# Patient Record
Sex: MALE | Race: WHITE | NOT HISPANIC OR LATINO | Employment: UNEMPLOYED | URBAN - METROPOLITAN AREA
[De-identification: names, ages, dates, MRNs, and addresses within clinical notes are randomized per-mention and may not be internally consistent; named-entity substitution may affect disease eponyms.]

---

## 2020-10-30 ENCOUNTER — OFFICE VISIT (OUTPATIENT)
Dept: INTERNAL MEDICINE | Facility: CLINIC | Age: 45
End: 2020-10-30
Payer: COMMERCIAL

## 2020-10-30 ENCOUNTER — HOSPITAL ENCOUNTER (OUTPATIENT)
Dept: GENERAL RADIOLOGY | Facility: CLINIC | Age: 45
End: 2020-10-30
Attending: INTERNAL MEDICINE
Payer: COMMERCIAL

## 2020-10-30 ENCOUNTER — TELEPHONE (OUTPATIENT)
Dept: INTERNAL MEDICINE | Facility: CLINIC | Age: 45
End: 2020-10-30

## 2020-10-30 VITALS
TEMPERATURE: 97.8 F | RESPIRATION RATE: 16 BRPM | HEIGHT: 70 IN | HEART RATE: 84 BPM | WEIGHT: 256 LBS | DIASTOLIC BLOOD PRESSURE: 78 MMHG | SYSTOLIC BLOOD PRESSURE: 126 MMHG | BODY MASS INDEX: 36.65 KG/M2 | OXYGEN SATURATION: 98 %

## 2020-10-30 DIAGNOSIS — G89.29 CHRONIC RIGHT SHOULDER PAIN: ICD-10-CM

## 2020-10-30 DIAGNOSIS — R10.84 ABDOMINAL PAIN, GENERALIZED: ICD-10-CM

## 2020-10-30 DIAGNOSIS — G89.29 CHRONIC MIDLINE THORACIC BACK PAIN: ICD-10-CM

## 2020-10-30 DIAGNOSIS — M54.6 CHRONIC MIDLINE THORACIC BACK PAIN: ICD-10-CM

## 2020-10-30 DIAGNOSIS — Z86.79 H/O AORTIC VALVE REPAIR: ICD-10-CM

## 2020-10-30 DIAGNOSIS — Q23.0 CONGENITAL STENOSIS OF AORTIC VALVE: Primary | ICD-10-CM

## 2020-10-30 DIAGNOSIS — Z80.0 FAMILY HISTORY OF PANCREATIC CANCER: ICD-10-CM

## 2020-10-30 DIAGNOSIS — M25.511 CHRONIC RIGHT SHOULDER PAIN: ICD-10-CM

## 2020-10-30 DIAGNOSIS — K92.1 BLOOD IN STOOL: ICD-10-CM

## 2020-10-30 DIAGNOSIS — Z72.0 TOBACCO ABUSE: ICD-10-CM

## 2020-10-30 DIAGNOSIS — Z98.890 H/O AORTIC VALVE REPAIR: ICD-10-CM

## 2020-10-30 LAB
ALBUMIN SERPL-MCNC: 3.4 G/DL (ref 3.4–5)
ALP SERPL-CCNC: 110 U/L (ref 40–150)
ALT SERPL W P-5'-P-CCNC: 39 U/L (ref 0–70)
AMYLASE SERPL-CCNC: 61 U/L (ref 30–110)
ANION GAP SERPL CALCULATED.3IONS-SCNC: 5 MMOL/L (ref 3–14)
AST SERPL W P-5'-P-CCNC: 18 U/L (ref 0–45)
BILIRUB SERPL-MCNC: 0.2 MG/DL (ref 0.2–1.3)
BUN SERPL-MCNC: 9 MG/DL (ref 7–30)
CALCIUM SERPL-MCNC: 8.9 MG/DL (ref 8.5–10.1)
CHLORIDE SERPL-SCNC: 109 MMOL/L (ref 94–109)
CO2 SERPL-SCNC: 27 MMOL/L (ref 20–32)
CREAT SERPL-MCNC: 0.79 MG/DL (ref 0.66–1.25)
ERYTHROCYTE [DISTWIDTH] IN BLOOD BY AUTOMATED COUNT: 13.6 % (ref 10–15)
GFR SERPL CREATININE-BSD FRML MDRD: >90 ML/MIN/{1.73_M2}
GLUCOSE SERPL-MCNC: 105 MG/DL (ref 70–99)
HCT VFR BLD AUTO: 44.5 % (ref 40–53)
HGB BLD-MCNC: 14.5 G/DL (ref 13.3–17.7)
LIPASE SERPL-CCNC: 188 U/L (ref 73–393)
MCH RBC QN AUTO: 28.4 PG (ref 26.5–33)
MCHC RBC AUTO-ENTMCNC: 32.6 G/DL (ref 31.5–36.5)
MCV RBC AUTO: 87 FL (ref 78–100)
PLATELET # BLD AUTO: 251 10E9/L (ref 150–450)
POTASSIUM SERPL-SCNC: 3.7 MMOL/L (ref 3.4–5.3)
PROT SERPL-MCNC: 7.7 G/DL (ref 6.8–8.8)
RBC # BLD AUTO: 5.11 10E12/L (ref 4.4–5.9)
SODIUM SERPL-SCNC: 141 MMOL/L (ref 133–144)
WBC # BLD AUTO: 12 10E9/L (ref 4–11)

## 2020-10-30 PROCEDURE — 85027 COMPLETE CBC AUTOMATED: CPT | Performed by: INTERNAL MEDICINE

## 2020-10-30 PROCEDURE — 72070 X-RAY EXAM THORAC SPINE 2VWS: CPT

## 2020-10-30 PROCEDURE — 80053 COMPREHEN METABOLIC PANEL: CPT | Performed by: INTERNAL MEDICINE

## 2020-10-30 PROCEDURE — 72100 X-RAY EXAM L-S SPINE 2/3 VWS: CPT

## 2020-10-30 PROCEDURE — 83690 ASSAY OF LIPASE: CPT | Performed by: INTERNAL MEDICINE

## 2020-10-30 PROCEDURE — 36415 COLL VENOUS BLD VENIPUNCTURE: CPT | Performed by: INTERNAL MEDICINE

## 2020-10-30 PROCEDURE — 82150 ASSAY OF AMYLASE: CPT | Performed by: INTERNAL MEDICINE

## 2020-10-30 PROCEDURE — 99204 OFFICE O/P NEW MOD 45 MIN: CPT | Performed by: INTERNAL MEDICINE

## 2020-10-30 RX ORDER — OMEGA-3 FATTY ACIDS/FISH OIL 300-1000MG
200 CAPSULE ORAL PRN
COMMUNITY

## 2020-10-30 RX ORDER — AMOXICILLIN 500 MG/1
CAPSULE ORAL
Qty: 12 CAPSULE | Refills: 0 | Status: SHIPPED | OUTPATIENT
Start: 2020-10-30

## 2020-10-30 ASSESSMENT — PAIN SCALES - GENERAL: PAINLEVEL: MILD PAIN (3)

## 2020-10-30 ASSESSMENT — MIFFLIN-ST. JEOR: SCORE: 2047.46

## 2020-10-30 NOTE — TELEPHONE ENCOUNTER
[facility]  Please give result info to pt when he returns call, per Dr Sanchez's note.  ................Alan Fowler LPN,   October 30, 2020,      2:02 PM,   [facility]

## 2020-10-30 NOTE — RESULT ENCOUNTER NOTE
Called pt - LMTCB for results, see phone encounter.  ................Alan Fowler LPN,   October 30, 2020,      2:03 PM,   [facility]

## 2020-10-30 NOTE — RESULT ENCOUNTER NOTE
Called pt - LMTCB for results, see phone encounter.  ................Alan Fowler LPN,   October 30, 2020,      2:03 PM,   [facility]

## 2020-10-30 NOTE — TELEPHONE ENCOUNTER
----- Message from Jeremy Sanchez MD sent at 10/30/2020  1:11 PM CDT -----  Please call and let him know his labs are okay with normal glucose, kidneys, liver tests.  His blood level is stable.  Let him know the thoracic and lumbar x-rays are okay without fractures, some arthritic changes. We will continue with the CAT scan and echocardiogram.

## 2020-10-30 NOTE — PROGRESS NOTES
Subjective     Nicolas Zimmerman is a 44 year old male who presents to clinic today for the following health issues:    HPI         Chief Complaint   Patient presents with     Establish Care     discuss few health concerns     Has a lot of back pain, can't stand very long, more in the upper back area and down the spine.  Uses a cane.  Feels he wore himself out.      Some blood in the stool as well. Happens when wiping.      Brother with pancreatic cancer this past year.  Patient feels abdominal pains and atypical symptoms.      Open heart at age 5 for aortic stenosis and fixed valve to a bicuspid valve.     History of broken arms from trauma.  Right arm can go numb.     Lives in [place], lives with girlfriend.   Smokes 1-2 cig a day.  Not much alcohol.   Not working currently.      Past Medical History:   Diagnosis Date     Congenital stenosis of aortic valve      Past Surgical History:   Procedure Laterality Date     CARDIAC SURGERY      open heart at age 5, fixed aortic stenosis but now bicuspid valve.     Current Outpatient Medications   Medication     amoxicillin (AMOXIL) 500 MG capsule     ibuprofen (ADVIL/MOTRIN) 200 MG capsule     No current facility-administered medications for this visit.      Social History     Tobacco Use     Smoking status: Current Every Day Smoker     Types: Cigarettes     Smokeless tobacco: Never Used   Substance Use Topics     Alcohol use: None     Drug use: None       Review of Systems   CONSTITUTIONAL: NEGATIVE for fever, chills, change in weight  INTEGUMENTARY/SKIN: NEGATIVE for worrisome rashes, moles or lesions  EYES: NEGATIVE for vision changes or irritation  ENT/MOUTH: NEGATIVE for ear, mouth and throat problems  RESP: NEGATIVE for significant cough or SOB  CV: NEGATIVE for chest pain, palpitations or peripheral edema  GI: abd pain and blood in the stool   : NEGATIVE for frequency, dysuria, or hematuria  MUSCULOSKELETAL:chronic back pains   NEURO: NEGATIVE for weakness, dizziness  "or paresthesias  ENDOCRINE: NEGATIVE for temperature intolerance, skin/hair changes  HEME: NEGATIVE for bleeding problems  PSYCHIATRIC: NEGATIVE for changes in mood or affect      Objective    /78   Pulse 84   Temp 97.8  F (36.6  C) (Temporal)   Resp 16   Ht 1.778 m (5' 10\")   Wt 116.1 kg (256 lb)   SpO2 98%   BMI 36.73 kg/m    Body mass index is 36.73 kg/m .  Physical Exam   GENERAL: healthy, alert and no distress  EYES: Eyes grossly normal to inspection, PERRL and conjunctivae and sclerae normal  HENT: ear canals and TM's normal, nose and mouth without ulcers or lesions  NECK: no adenopathy, no asymmetry, masses, or scars and thyroid normal to palpation  RESP: lungs clear to auscultation - no rales, rhonchi or wheezes  CV: regular rates and rhythm, normal S1 S2, no S3 or S4, grade 2/6 systolic murmur heard best over the , peripheral pulses strong and no peripheral edema  ABDOMEN: soft, nontender, no hepatosplenomegaly, no masses and bowel sounds normal  MS: no gross musculoskeletal defects noted, no edema--right shoulder has normal movement but does have some grinding on examination  Spinous tenderness palpation in the thoracic and in the L1-2 region  SKIN: no suspicious lesions or rashes  NEURO: Normal strength and tone, mentation intact and speech normal  PSYCH: mentation appears normal, affect normal/bright            Assessment & Plan     Congenital stenosis of aortic valve  Congenital stenosis of the aortic valve was treated and treated with surgery at age 5, will get an echo.  - Echocardiogram Complete; Future  - amoxicillin (AMOXIL) 500 MG capsule; 2000 mg before dental or procedures    H/O aortic valve repair  Murmur is present we will check an echocardiogram.  - Echocardiogram Complete; Future  - amoxicillin (AMOXIL) 500 MG capsule; 2000 mg before dental or procedures    Blood in stool  We will check his hemoglobin today, most likely this is hemorrhoids but needs a colonoscopy to rule out " "anything higher up causing the blood.  - GASTROENTEROLOGY ADULT REF PROCEDURE ONLY; Future  - CBC with platelets  - Comprehensive metabolic panel    Abdominal pain, generalized  Abdominal pain with some blood in the stool concern with family history of pancreatic cancer, patient is very worried.  We will proceed with an abdominal CT, LFTs, amylase lipase.  - CBC with platelets  - Comprehensive metabolic panel  - Lipase  - Amylase  - CT Abdomen Pelvis w Contrast; Future    Chronic midline thoracic back pain  We will check x-ray for the thoracic back pain.  - XR Thoracic Spine 2 Views; Future  - XR Lumbar Spine 2/3 Views; Future    Chronic right shoulder pain  Right shoulder pain will continue to work on range of motion he may see orthopedics in the future do further work-up as needed.    Tobacco abuse  Recommended he stop smoking    Family history of pancreatic cancer  Getting a CT scan       Tobacco Cessation:   reports that he has been smoking cigarettes. He has never used smokeless tobacco.        BMI:   Estimated body mass index is 36.73 kg/m  as calculated from the following:    Height as of this encounter: 1.778 m (5' 10\").    Weight as of this encounter: 116.1 kg (256 lb).   Weight management plan: Discussed healthy diet and exercise guidelines             No follow-ups on file.    Jeremy Sanchez MD  [facility]    "

## 2020-11-04 ENCOUNTER — TELEPHONE (OUTPATIENT)
Dept: FAMILY MEDICINE | Facility: CLINIC | Age: 45
End: 2020-11-04

## 2020-11-04 ENCOUNTER — HOSPITAL ENCOUNTER (OUTPATIENT)
Dept: CARDIOLOGY | Facility: CLINIC | Age: 45
End: 2020-11-04
Attending: INTERNAL MEDICINE
Payer: COMMERCIAL

## 2020-11-04 ENCOUNTER — HOSPITAL ENCOUNTER (OUTPATIENT)
Dept: CT IMAGING | Facility: CLINIC | Age: 45
End: 2020-11-04
Attending: INTERNAL MEDICINE
Payer: COMMERCIAL

## 2020-11-04 DIAGNOSIS — R10.84 ABDOMINAL PAIN, GENERALIZED: ICD-10-CM

## 2020-11-04 DIAGNOSIS — Z11.59 ENCOUNTER FOR SCREENING FOR OTHER VIRAL DISEASES: Primary | ICD-10-CM

## 2020-11-04 DIAGNOSIS — Z98.890 H/O AORTIC VALVE REPAIR: ICD-10-CM

## 2020-11-04 DIAGNOSIS — Q23.0 CONGENITAL STENOSIS OF AORTIC VALVE: ICD-10-CM

## 2020-11-04 DIAGNOSIS — Z86.79 H/O AORTIC VALVE REPAIR: ICD-10-CM

## 2020-11-04 PROCEDURE — 93306 TTE W/DOPPLER COMPLETE: CPT | Mod: 26 | Performed by: INTERNAL MEDICINE

## 2020-11-04 PROCEDURE — 250N000011 HC RX IP 250 OP 636: Performed by: INTERNAL MEDICINE

## 2020-11-04 PROCEDURE — 74177 CT ABD & PELVIS W/CONTRAST: CPT

## 2020-11-04 PROCEDURE — 255N000002 HC RX 255 OP 636: Performed by: INTERNAL MEDICINE

## 2020-11-04 PROCEDURE — 250N000009 HC RX 250: Performed by: INTERNAL MEDICINE

## 2020-11-04 RX ORDER — IOPAMIDOL 755 MG/ML
500 INJECTION, SOLUTION INTRAVASCULAR ONCE
Status: COMPLETED | OUTPATIENT
Start: 2020-11-04 | End: 2020-11-04

## 2020-11-04 RX ADMIN — IOPAMIDOL 100 ML: 755 INJECTION, SOLUTION INTRAVENOUS at 14:32

## 2020-11-04 RX ADMIN — PERFLUTREN 3 ML: 6.52 INJECTION, SUSPENSION INTRAVENOUS at 13:51

## 2020-11-04 RX ADMIN — SODIUM CHLORIDE 60 ML: 9 INJECTION, SOLUTION INTRAVENOUS at 14:31

## 2020-11-04 NOTE — TELEPHONE ENCOUNTER
Left message for patient to return call to schedule EGD/colonoscopy. If Cynthia or Meenakshi are not available, please transfer to same day surgery

## 2020-11-04 NOTE — TELEPHONE ENCOUNTER
Date of colonoscopy/EGD: 11/25  Surgeon: Dr. Valdovinos  Prep:Miralax  Packet:Colonoscopy/EGD instructions mailed to patient's home address.   Date: 11/4/2020      Surgery Scheduler

## 2020-11-05 ENCOUNTER — TELEPHONE (OUTPATIENT)
Dept: INTERNAL MEDICINE | Facility: CLINIC | Age: 45
End: 2020-11-05

## 2020-11-05 NOTE — TELEPHONE ENCOUNTER
----- Message from Jeremy Sanchez MD sent at 11/4/2020  5:04 PM CST -----  Please, know his echocardiogram shows the changes of his heart surgery but still normal heart function.  We could have cardiology evaluate him in the future.

## 2020-11-06 ENCOUNTER — TELEPHONE (OUTPATIENT)
Dept: INTERNAL MEDICINE | Facility: CLINIC | Age: 45
End: 2020-11-06

## 2020-11-06 NOTE — TELEPHONE ENCOUNTER
Left message for patient to call back, please give results and help schedule a follow up appointment.    Natividad Taylor XRO/

## 2020-11-06 NOTE — TELEPHONE ENCOUNTER
----- Message from Jeremy Sanchez MD sent at 11/6/2020 12:04 PM CST -----  Please let him know his CAT scan shows some inflammation called mesenteric adenitis in the fat of the abdomen.  There is no masses or any issue by the pancreas.  He does have some fatty infiltration of the liver.  I would like to discuss and see him after he has his colonoscopy.`

## 2020-11-21 DIAGNOSIS — Z11.59 ENCOUNTER FOR SCREENING FOR OTHER VIRAL DISEASES: ICD-10-CM

## 2020-11-21 PROCEDURE — U0003 INFECTIOUS AGENT DETECTION BY NUCLEIC ACID (DNA OR RNA); SEVERE ACUTE RESPIRATORY SYNDROME CORONAVIRUS 2 (SARS-COV-2) (CORONAVIRUS DISEASE [COVID-19]), AMPLIFIED PROBE TECHNIQUE, MAKING USE OF HIGH THROUGHPUT TECHNOLOGIES AS DESCRIBED BY CMS-2020-01-R: HCPCS | Performed by: INTERNAL MEDICINE

## 2020-11-22 LAB
LABORATORY COMMENT REPORT: NORMAL
SARS-COV-2 RNA SPEC QL NAA+PROBE: NEGATIVE
SARS-COV-2 RNA SPEC QL NAA+PROBE: NORMAL
SPECIMEN SOURCE: NORMAL
SPECIMEN SOURCE: NORMAL

## 2020-11-25 ENCOUNTER — HOSPITAL ENCOUNTER (OUTPATIENT)
Facility: CLINIC | Age: 45
Discharge: HOME | End: 2020-11-25
Attending: INTERNAL MEDICINE | Admitting: INTERNAL MEDICINE
Payer: COMMERCIAL

## 2020-11-25 VITALS
HEART RATE: 77 BPM | OXYGEN SATURATION: 95 % | TEMPERATURE: 98.4 F | RESPIRATION RATE: 16 BRPM | DIASTOLIC BLOOD PRESSURE: 82 MMHG | SYSTOLIC BLOOD PRESSURE: 112 MMHG

## 2020-11-25 LAB — COLONOSCOPY: NORMAL

## 2020-11-25 PROCEDURE — G0500 MOD SEDAT ENDO SERVICE >5YRS: HCPCS | Performed by: INTERNAL MEDICINE

## 2020-11-25 PROCEDURE — 250N000009 HC RX 250: Performed by: INTERNAL MEDICINE

## 2020-11-25 PROCEDURE — 250N000011 HC RX IP 250 OP 636: Performed by: INTERNAL MEDICINE

## 2020-11-25 PROCEDURE — 88305 TISSUE EXAM BY PATHOLOGIST: CPT | Mod: TC | Performed by: INTERNAL MEDICINE

## 2020-11-25 PROCEDURE — 45385 COLONOSCOPY W/LESION REMOVAL: CPT | Mod: PT | Performed by: INTERNAL MEDICINE

## 2020-11-25 PROCEDURE — 88305 TISSUE EXAM BY PATHOLOGIST: CPT | Mod: 26 | Performed by: PATHOLOGY

## 2020-11-25 RX ORDER — ONDANSETRON 4 MG/1
4 TABLET, ORALLY DISINTEGRATING ORAL EVERY 6 HOURS PRN
Status: DISCONTINUED | OUTPATIENT
Start: 2020-11-25 | End: 2020-11-25 | Stop reason: HOSPADM

## 2020-11-25 RX ORDER — NALOXONE HYDROCHLORIDE 0.4 MG/ML
0.2 INJECTION, SOLUTION INTRAMUSCULAR; INTRAVENOUS; SUBCUTANEOUS
Status: DISCONTINUED | OUTPATIENT
Start: 2020-11-25 | End: 2020-11-25 | Stop reason: HOSPADM

## 2020-11-25 RX ORDER — FENTANYL CITRATE 50 UG/ML
INJECTION, SOLUTION INTRAMUSCULAR; INTRAVENOUS PRN
Status: DISCONTINUED | OUTPATIENT
Start: 2020-11-25 | End: 2020-11-25 | Stop reason: HOSPADM

## 2020-11-25 RX ORDER — NALOXONE HYDROCHLORIDE 0.4 MG/ML
0.4 INJECTION, SOLUTION INTRAMUSCULAR; INTRAVENOUS; SUBCUTANEOUS
Status: DISCONTINUED | OUTPATIENT
Start: 2020-11-25 | End: 2020-11-25 | Stop reason: HOSPADM

## 2020-11-25 RX ORDER — PROCHLORPERAZINE MALEATE 5 MG
10 TABLET ORAL EVERY 6 HOURS PRN
Status: DISCONTINUED | OUTPATIENT
Start: 2020-11-25 | End: 2020-11-25 | Stop reason: HOSPADM

## 2020-11-25 RX ORDER — ONDANSETRON 2 MG/ML
4 INJECTION INTRAMUSCULAR; INTRAVENOUS EVERY 6 HOURS PRN
Status: DISCONTINUED | OUTPATIENT
Start: 2020-11-25 | End: 2020-11-25 | Stop reason: HOSPADM

## 2020-11-25 RX ORDER — LIDOCAINE 40 MG/G
CREAM TOPICAL
Status: DISCONTINUED | OUTPATIENT
Start: 2020-11-25 | End: 2020-11-25 | Stop reason: HOSPADM

## 2020-11-25 RX ORDER — FLUMAZENIL 0.1 MG/ML
0.2 INJECTION, SOLUTION INTRAVENOUS
Status: DISCONTINUED | OUTPATIENT
Start: 2020-11-25 | End: 2020-11-25 | Stop reason: HOSPADM

## 2020-11-25 RX ORDER — ONDANSETRON 2 MG/ML
4 INJECTION INTRAMUSCULAR; INTRAVENOUS
Status: DISCONTINUED | OUTPATIENT
Start: 2020-11-25 | End: 2020-11-25 | Stop reason: HOSPADM

## 2020-11-25 RX ADMIN — LIDOCAINE HYDROCHLORIDE 1 ML: 10 INJECTION, SOLUTION EPIDURAL; INFILTRATION; INTRACAUDAL; PERINEURAL at 09:58

## 2020-11-25 NOTE — CONSULTS
[facility] GI Pre-Procedure Physical Assessment    Nicolas Zimmerman MRN# [id number]   Age: 45 year old YOB: 1975      Date of Surgery: 11/25/2020  Location [facility]      Date of Exam 11/25/2020 Facility (Same day)       Home clinic: [facility]  Primary care provider: No Ref-Primary, Physician         Active problem list:   There is no problem list on file for this patient.           Medications (include herbals and vitamins):   Any Plavix use in the last 7 days?  No     Current Facility-Administered Medications   Medication     lidocaine (LMX4) kit     lidocaine 1 % 0.1-1 mL     ondansetron (ZOFRAN) injection 4 mg     sodium chloride (PF) 0.9% PF flush 3 mL     sodium chloride (PF) 0.9% PF flush 3 mL             Allergies:    No Known Allergies  Allergy to Latex?  No  Allergy to tape?    No          Social History:     Social History     Tobacco Use     Smoking status: Current Every Day Smoker     Types: Cigarettes     Smokeless tobacco: Never Used   Substance Use Topics     Alcohol use: Not on file            Physical Exam:   All vitals have been reviewed  Blood pressure 106/83, pulse 89, temperature 98.4  F (36.9  C), temperature source Oral, resp. rate 18, SpO2 94 %.  Airway assessment:   Patient is able to open mouth wide  Patient is able to stick out tongue      Lungs:   No increased work of breathing, good air exchange, clear to auscultation bilaterally, no crackles or wheezing      Cardiovascular:   Normal apical impulse, regular rate and rhythm, normal S1 and S2, no S3 or S4, and no murmur noted           Lab / Radiology Results:   All laboratory data reviewed          Assessment:   Appropriately NPO  Chief complaint or anatomic assessment of involved area: screening         Plan:   Moderate (conscious) sedation     Patient's active problems diagnostically and therapeutically optimized for the planned procedure  Risks, benefits, alternatives to sedation  and blood explained and consent obtained  Risks, benefits, alternatives to procedure explained and consent obtained  Orders and progress notes are in the chart  Discharge from Phase 1 and / or Phase 2 recovery when patient meets criteria    I have reviewed the history and physical, lab finding(s), diagnostic data, medicaitons, and the plan for sedation.  I have determined this patient to be an appropriate candidate for the planned sedation / procedure and have reassessed the patient immediately prior to sedation / procedure.    I have personally and medically directed the administration of medications used.    Milton Valdovinos MD

## 2020-11-25 NOTE — LETTER
[address] [address]           Tel [phone]     Nicolas CLARICE ChampagneErasmo  [address] [address] [place] [address]  [facility] [address]      November 30, 2020    Dear Nicolas,    This letter is written to inform you of the results of your recent colonoscopy.  One polyp was a hyperplastic polyp.  This has no clinical significance.  The other polyp was an adenoma.    Adenomatous polyps are entirely benign (non-cancerous); however, patients who have developed these polyps are at an increased risk for developing additional polyps in the future. If these are not eventually removed, there is a risk of developing colon cancer. We will advise more frequent examinations with you because of the risk associated with this type of polyp.    Given these findings and your family history of colon cancer, I recommend that you undergo a repeat colonoscopy in 5 year(s) for surveillance. We will enter you into a recall system so you receive a reminder closer to the time that you are due for repeat examination.     Please remember that this recommendation is made with the understanding that you are not experiencing persistent changes in bowel function, bleeding per rectum, and/or significant abdominal pain. If you experience these symptoms, please contact your primary care provider for a further evaluation.     If you have any questions or concerns about the results of your colonoscopy or the appropriate follow-up, please contact my assistant at [phone].    Sincerely,        Milton Valdovinos MD  Gastroenterology

## 2020-11-25 NOTE — DISCHARGE INSTRUCTIONS
[facility]    Home Care Following Endoscopy          Activity:    You have just undergone an endoscopic procedure usually performed with conscious sedation.  Do not work or operate machinery (including a car) for at least 12 hours.      I encourage you to walk and attempt to pass this air as soon as possible.    Diet:    Return to the diet you were on before your procedure but eat lightly for the first 12-24 hours.    Drink plenty of water.    Resume any regular medications unless otherwise advised by your physician.  Please begin any new medication prescribed as a result of your procedure as directed by your physician.     If you had any biopsy or polyp removed please refrain from aspirin or aspirin products for 2 days.  If on Coumadin please restart as instructed by your physician.   Pain:    You may take Tylenol as needed for pain.  Expected Recovery:    You can expect some mild abdominal fullness and/or discomfort due to the air used to inflate your intestinal tract. It is also normal to have a mild sore throat after upper endoscopy.    Call Your Physician if You Have:    After Colonoscopy:  o Worsening persisting abdominal pain which is worse with activity.  o Fevers (>101 degrees F), chills or shakes.  o Passage of continued blood with bowel movements.   Any questions or concerns about your recovery, please call [phone] or after hours [phone] Nurse Advice Line.    Follow-up Care:    You should receive a call or letter with your results within 1 week. Please call if you have not received a notification of your results.  If asked to return to clinic please make an appointment 1 week after your procedure.  Call [phone].

## 2020-11-30 LAB — COPATH REPORT: NORMAL

## 2020-12-14 ENCOUNTER — OFFICE VISIT (OUTPATIENT)
Dept: INTERNAL MEDICINE | Facility: CLINIC | Age: 45
End: 2020-12-14
Payer: COMMERCIAL

## 2020-12-14 VITALS
WEIGHT: 259.9 LBS | BODY MASS INDEX: 37.29 KG/M2 | DIASTOLIC BLOOD PRESSURE: 88 MMHG | SYSTOLIC BLOOD PRESSURE: 122 MMHG | TEMPERATURE: 97.3 F

## 2020-12-14 DIAGNOSIS — G89.29 CHRONIC BILATERAL THORACIC BACK PAIN: ICD-10-CM

## 2020-12-14 DIAGNOSIS — Z86.0100 HISTORY OF COLONIC POLYPS: ICD-10-CM

## 2020-12-14 DIAGNOSIS — M54.6 CHRONIC BILATERAL THORACIC BACK PAIN: ICD-10-CM

## 2020-12-14 DIAGNOSIS — Q23.0 CONGENITAL STENOSIS OF AORTIC VALVE: Primary | ICD-10-CM

## 2020-12-14 PROCEDURE — 99214 OFFICE O/P EST MOD 30 MIN: CPT | Performed by: INTERNAL MEDICINE

## 2020-12-14 NOTE — PROGRESS NOTES
Subjective     Nicolas Zimmerman is a 45 year old male who presents to clinic today for the following health issues:    HPI         Discuss results of CT scan, echocardiogram and colonoscopy    Previous blood in stool, colonoscopy was ok, couple polyps.  Does have a few hemorrhoids.  Should use fiber.      Echo and heart is ok, has some moderate aortic stenosis.    Has pain in his joints and in his back.  Has had wear and tear on his body.      Tries to stretch and exercise, can only stand for an hour or so at a time.      Past Medical History:   Diagnosis Date     Congenital stenosis of aortic valve      Current Outpatient Medications   Medication     ibuprofen (ADVIL/MOTRIN) 200 MG capsule     amoxicillin (AMOXIL) 500 MG capsule     No current facility-administered medications for this visit.      Social History     Tobacco Use     Smoking status: Current Every Day Smoker     Types: Cigarettes     Smokeless tobacco: Never Used   Substance Use Topics     Alcohol use: Yes     Comment: social, very rare     Drug use: Never     Review of Systems   CONSTITUTIONAL: NEGATIVE for fever, chills, change in weight  INTEGUMENTARY/SKIN: NEGATIVE for worrisome rashes, moles or lesions  EYES: NEGATIVE for vision changes or irritation  ENT/MOUTH: NEGATIVE for ear, mouth and throat problems  RESP: NEGATIVE for significant cough or SOB  CV: NEGATIVE for chest pain, palpitations or peripheral edema  GI: POSITIVE for blood in stool.   MUSCULOSKELETAL:back pains and joints pains,   NEURO: NEGATIVE for weakness, dizziness or paresthesias  ENDOCRINE: NEGATIVE for temperature intolerance, skin/hair changes  HEME: NEGATIVE for bleeding problems  PSYCHIATRIC: NEGATIVE for changes in mood or affect      Objective    /88   Temp 97.3  F (36.3  C) (Temporal)   Wt 117.9 kg (259 lb 14.4 oz)   BMI 37.29 kg/m    There is no height or weight on file to calculate BMI.  Physical Exam   GENERAL: healthy, alert and no distress  RESP: lungs clear to  auscultation - no rales, rhonchi or wheezes  CV: regular rates and rhythm, normal S1 S2, no S3 or S4, grade 2/6 systolic murmur heard best over the base, peripheral pulses strong and no peripheral edema  ABDOMEN: soft, nontender, no hepatosplenomegaly, no masses and bowel sounds normal          Assessment & Plan     Congenital stenosis of aortic valve  45-year-old gentleman who has a history of a congenital aortic valve which was repaired when he was 5, echocardiogram shows some thickened valves and moderate aortic stenosis we will get a cardiology consult to help follow this long-term.  - CARDIOLOGY EVAL ADULT REFERRAL; Future    Chronic bilateral thoracic back pain  Chronic bilateral thoracic and lumbar back pain, will refer to chiropractor to see if they can work with him to get stronger and help his symptoms he can stand better.  - CHIROPRACTIC REFERRAL    History of colonic polyps  History of colon polyps he will get a repeat colonoscopy in 5 years.  Had some internal hemorrhoids which be the cause of his bleeding.                No follow-ups on file.    Jeremy Sanchez MD  [facility]

## 2020-12-21 ENCOUNTER — THERAPY VISIT (OUTPATIENT)
Dept: CHIROPRACTIC MEDICINE | Facility: CLINIC | Age: 45
End: 2020-12-21
Attending: INTERNAL MEDICINE
Payer: COMMERCIAL

## 2020-12-21 DIAGNOSIS — M99.04 SEGMENTAL DYSFUNCTION OF SACRAL REGION: ICD-10-CM

## 2020-12-21 DIAGNOSIS — M54.9 MECHANICAL BACK PAIN: ICD-10-CM

## 2020-12-21 DIAGNOSIS — M99.02 SEGMENTAL DYSFUNCTION OF THORACIC REGION: Primary | ICD-10-CM

## 2020-12-21 DIAGNOSIS — M99.03 SEGMENTAL DYSFUNCTION OF LUMBAR REGION: ICD-10-CM

## 2020-12-21 PROCEDURE — 97035 APP MDLTY 1+ULTRASOUND EA 15: CPT | Mod: GP | Performed by: CHIROPRACTOR

## 2020-12-21 PROCEDURE — 99203 OFFICE O/P NEW LOW 30 MIN: CPT | Mod: 25 | Performed by: CHIROPRACTOR

## 2020-12-21 PROCEDURE — 98941 CHIROPRACT MANJ 3-4 REGIONS: CPT | Mod: AT | Performed by: CHIROPRACTOR

## 2020-12-21 PROCEDURE — 97110 THERAPEUTIC EXERCISES: CPT | Mod: GP | Performed by: CHIROPRACTOR

## 2020-12-21 NOTE — PROGRESS NOTES
"Chiropractic Clinic Visit    PCP: No Ref-Primary, Physician    Nicolas ONEIL Erasmo is a 45 year old male who is seen as a self referral presenting with thoracic spine pain. He states that her has \"used himself up.\" He points to pain in his neck and CT junction and where his shoulder blades meet. He feels like he cannot straighten out his back where the shoulder blades meet. He states that his pain started about 20 years and has \"grown with his life.\" He has worked a lot of hard jobs and feels like his body is \"just getting used up.\" He gets pinched nerves feelings in his back. He describes the pain as sharp, but dull all the time. It is lightening at times. He does have lower back pain, as well. His right thigh goes numb at times. He rates his pain as \"I have a high pain tolerance.\" He does state that his pain is rated 7-8/10 with spasms \"and stuff.\" His ribs are sharp, too. He is sleeping okay at night but worked overnights for so long that he is \"reversed.\" He has a lot going on with his family and in his personal life that is affecting him. He is sleeping on a broken bed. He is taking Advil for pain and he likes to not have to take anything as he has a previous heart condition. He has been without medical care for a long time as he was well established when he was younger and then hasn't been seen. He saw Dr. Sanchez but is trying to establish care today. He has back issues in his family and has tried to echo the stretches as best as he can. He has not used ice or heat. He feels very tight all the time. His pain depends on what he was doing the day before. He has never been to a chiropractor before.        Injury: Nothing specific but played soccer and football    Location of Pain: central and bilateral thoracic and cervical spine pain  Duration of Pain: 20 yrs  Rating of Pain at worst: 10/10  Rating of Pain Currently: 7-8/10  Symptoms are better with: Nothing  Symptoms are worse with: \"whatever he does\"  Additional " Features: Patient notes that he has had a horrible year. He is very tearful today.        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    Past Medical History:   Diagnosis Date     Congenital stenosis of aortic valve      Past Surgical History:   Procedure Laterality Date     CARDIAC SURGERY      open heart at age 5, fixed aortic stenosis but now bicuspid valve.       [id number]  There were no vitals taken for this visit.    GENERAL APPEARANCE: healthy, alert and no distress   GAIT: NORMAL  SKIN: no suspicious lesions or rashes  NEURO: Normal strength and tone, mentation intact and speech normal  PSYCH:  mentation appears normal and affect normal/bright    Nicolas was asked to complete the Neck Disability Index, today in the office. Patient did not fill out any paperwork today.     Cervical Spine Exam    Range of Motion:   Extension and LR mildly reduced and patient feels pain    Inspection:         Anterior head position    Tender:   Central and bilateral upper back, thoracic and lower back pain      Muscle strength:       C5 (shoulder  abduction) symmetric 5/5 Normal       C6 (elbow flexion) symmetric 5/5 Normal       C7 (elbow extension) symmetric 5/5 Normal       C8 (finger abduction, thumb flexion) symmetric 5/5 Normal    Reflexes:        C5 (biceps) symmetric 2 bilaterally       C6 (supinator) symmetric 2 bilaterally       C7 (triceps) symmetric 2 bilaterally    Sensation:       grossly intact througout upper extremities bilaterally    Special Tests:  Colby's- negative, Distraction - negative and Shoulder depression - Right negative and Left negative    Lymphatics:        no edema noted in the upper extremities       Segmental spinal dysfunction/restrictions found at:  T1 , T5 , T10, L4  and PSIS Left     Patient's report of high pain tolerance did not correlate with demonstration of gentle pressure with treatment.     Muscle spasm found in:  Lumbar erector spine, T-spine paraspinal and Traps      Radiology:  THORACIC SPINE TWO VIEWS  10/30/2020 11:28 AM      COMPARISON: None.     HISTORY: Chronic midline thoracic back pain.                                                                      IMPRESSION: There is normal alignment of the thoracic vertebrae.  Vertebral body heights of the thoracic spine are normal. No evidence  for fracture of the thoracic spine. There is degenerative endplate  spurring to varying degrees at all levels of the thoracic spine.     HUNTER ESPINO MD    Assessment:    No diagnosis found.    RX ordered/plan of care: Mechanical back pain likely due to previous trauma, with associated myospasm and intersegmental dysfunction.  Anticipated outcomes: Patient is expected to get some relief with care.  Possible risks and side effects: Minimal soreness expected post-adjustment.     After discussing the risk and benefits of care, patient consented to treatment.    Patient's condition:  Patient had restrictions pre-manipulation and Patient had decreased motion prior to manipulation    Treatment effectiveness:  Post  manipulation there is better intersegmental movement and Patient claims to feel looser post manipulation    Plan:    Procedures:    Evaluation and Management:  08149 Moderate level exam 30 min    CMT:  09748 Chiropractic manipulative treatment 3-4 regions performed   Thoracic: Diversified, T1, T5, T10, Prone  Lumbar: Drop Table, L4, Prone  Pelvis: Drop Table, PSIS Left , Prone  Flexion Distraction to stretch paraspinals.    Modalities:  05075: US:  1.0 Pathak/cm squared for 8 minutes at 1.2mhz  Continuous  pulsed, Location: bilateral thoracic paraspinals    Therapeutic procedures:  83622: Therapeutic Exercises - postural stretches demonstrated and practiced.   Gave patient Ice instructions post adjustment, and instructions for acute care    Response to Treatment:  Patient tolerated treatment well today.     Prognosis: Guarded      Treatment plan and goals:  Goals:  Decrease pain in thoracic and lower back.  Promote general health and improve posture.  Establish care.     Frequency of care  Duration of care is estimated to be 6 weeks, from the initial treatment.  It is estimated that the patient will need a total of 8 visits to resolve this episode.  For the initial therapeutic trial of care, the frequency is recommended at 1-2 times per week.  A reevaluation would be clinically appropriate in 8 visits, to determine progress and further course of care.    In-Office Treatment  Evaluation  Spinal Chiropractic Manipulative Therapy:  Trial of care - re-evaluate after 8 visits.       Recommendations:    Instructions:  stretch as instructed  and heat 15 minutes every other hour as needed    Follow-up:  Return to care in 1 week. Patient was emotional throughout treatment. Consider referral for mental health if necessary.      Disclaimer: This note consists of symbols derived from keyboarding, dictation and/or voice recognition software. As a result, there may be errors in the script that have gone undetected. Please consider  this when interpreting information found in this chart.

## 2020-12-28 ENCOUNTER — THERAPY VISIT (OUTPATIENT)
Dept: CHIROPRACTIC MEDICINE | Facility: CLINIC | Age: 45
End: 2020-12-28
Payer: COMMERCIAL

## 2020-12-28 DIAGNOSIS — M54.9 MECHANICAL BACK PAIN: ICD-10-CM

## 2020-12-28 DIAGNOSIS — M99.02 SEGMENTAL DYSFUNCTION OF THORACIC REGION: Primary | ICD-10-CM

## 2020-12-28 DIAGNOSIS — M99.03 SEGMENTAL DYSFUNCTION OF LUMBAR REGION: ICD-10-CM

## 2020-12-28 DIAGNOSIS — M99.04 SEGMENTAL DYSFUNCTION OF SACRAL REGION: ICD-10-CM

## 2020-12-28 PROCEDURE — 98941 CHIROPRACT MANJ 3-4 REGIONS: CPT | Mod: AT | Performed by: CHIROPRACTOR

## 2020-12-28 NOTE — PROGRESS NOTES
"Visit #:  2 of 8 based on treatment plan 12/21/2020    Subjective:  Nicolas Zimmerman is a 45 year old male who is seen in f/u up for:     Data Unavailable.     Since last visit on 12/21/2020,  Nicolas Zimmerman reports the following changes: Patient presents and states that he had \"quite a day after he left here.\" He had to go and move a mattress from [employer] and it was a rough day. It took him more than a day to recover from that. He did feel some relief in his neck that day after that \"really good crack.\" He rates his current pain level 5/10 today. Today is a better day for him, the [place] week was more \"chill\" for him and he was able to stretch. His new bed has also helped.     Patient has multiple questions regarding mechanical pain, his x-ray findings, etc. I did my best to answer his questions to understand his pain.        Objective:  The following was observed:    P: palpatory tenderness neck and throughout back    A: static palpation demonstrates intersegmental asymmetry     R: motion palpation notes restricted motion    T: localized muscle spasm at: T-spine paraspinal and Traps Bilaterally      Assessment:    RX ordered/plan of care: Mechanical back pain likely due to previous trauma, with associated myospasm and intersegmental dysfunction.  Anticipated outcomes: Patient is expected to get some relief with care.  Possible risks and side effects: Minimal soreness expected post-adjustment.     Segmental spinal dysfunction/restrictions found at:  T1   T5  T10  L1  L2  L3  L4  L5  PSIS Left    Diagnoses:    No diagnosis found.    Patient's condition:  Patient had restrictions pre-manipulation and Patient had decreased motion prior to manipulation    Treatment effectiveness:  Post manipulation there is better intersegmental movement and Patient claims to feel looser post manipulation      Procedures:  CMT:  96334 Chiropractic manipulative treatment 3-4 regions performed   Thoracic: Mobilization, T1, T5, T10, " Prone  Lumbar: Drop Table, L4, Prone  Pelvis: Drop Table, PSIS Left , Prone  Flexion Distraction of lumbar spine.     Modalities:  47181: MSTM:  To Lumbar erector spine, T-spine paraspinal and Traps  for 5 min HYPERVOLT    Therapeutic procedures:  Gave patient Ice instructions post adjustment, and instructions for acute care      Prognosis: Guarded    Progress towards Goals:   Decrease pain in thoracic and lower back.  Promote general health and improve posture.  Establish care.       Response to Treatment:   Reduction of symptoms with first treatment.       Recommendations:    Instructions:ice 20 minutes every other hour as needed, heat 15 minutes every other hour as needed and stretch as instructed at visit    Follow-up:  Return to care in 1 week.

## 2021-01-07 ENCOUNTER — HOSPITAL ENCOUNTER (OUTPATIENT)
Dept: CARDIOLOGY | Facility: CLINIC | Age: 46
Discharge: HOME | End: 2021-01-07
Attending: INTERNAL MEDICINE | Admitting: INTERNAL MEDICINE
Payer: COMMERCIAL

## 2021-01-07 ENCOUNTER — OFFICE VISIT (OUTPATIENT)
Dept: CARDIOLOGY | Facility: CLINIC | Age: 46
End: 2021-01-07
Payer: COMMERCIAL

## 2021-01-07 VITALS
HEIGHT: 70 IN | WEIGHT: 263.2 LBS | OXYGEN SATURATION: 97 % | HEART RATE: 88 BPM | BODY MASS INDEX: 37.68 KG/M2 | DIASTOLIC BLOOD PRESSURE: 82 MMHG | SYSTOLIC BLOOD PRESSURE: 110 MMHG

## 2021-01-07 DIAGNOSIS — Q21.0 VSD (VENTRICULAR SEPTAL DEFECT): Primary | ICD-10-CM

## 2021-01-07 DIAGNOSIS — Q23.0 CONGENITAL STENOSIS OF AORTIC VALVE: ICD-10-CM

## 2021-01-07 PROCEDURE — 99204 OFFICE O/P NEW MOD 45 MIN: CPT | Performed by: INTERNAL MEDICINE

## 2021-01-07 PROCEDURE — 93010 ELECTROCARDIOGRAM REPORT: CPT | Performed by: INTERNAL MEDICINE

## 2021-01-07 PROCEDURE — 93005 ELECTROCARDIOGRAM TRACING: CPT | Performed by: REHABILITATION PRACTITIONER

## 2021-01-07 ASSESSMENT — MIFFLIN-ST. JEOR: SCORE: 2075.12

## 2021-01-07 NOTE — LETTER
1/7/2021    Physician No Ref-Primary  No address on file    RE: Nicolas Zimmerman       Dear Colleague,    I had the pleasure of seeing Nicolas Zimmerman in the [facility].          HPI:     This is a 45 year old male here for evaluation of bicuspid valve. In 1980 he had open heart surgery at age 5 for aortic stenosis at [facility]. Reports he had a tricuspid valve that was not opening well and surgeon turned tricuspid valve into bicuspid valve. Also possible spontaneously resolved muscular VSD.     Family history negative for SCD or unexplained heart attacks, aortic disease.     On ROS: no lens dislocation, normal eye-width, normal uvula, normal palate, has small hypermobility in thumb joints, no skin translucency, normal skin, hernias, no abnormal wound healing, easy bruising or bleeding, no chest wall deformities, no dental crowding, normal stature. No scoliosis. Has chronic back pain and recurrent joint issues however. Uses a cane to walk.     Echocardiogram images reviewed in clinic in detail with patient present: agree with below report, probable bicuspid valve.     Repaired congenital heart disease. Aortic stenosis and tricuspid valve  repaired in childhood per patient, details not available.  The left ventricle is normal in size.  Mid septal divit, surrounding hypokinesis, may represent repaired VSD or  spontaniously closed muscular VSD  There is mild to moderate concentric left ventricular hypertrophy.  The visual ejection fraction is estimated at 60-65%.  Grade I or early diastolic dysfunction.  Diastolic Doppler findings (E/E' ratio and/or other parameters) suggest left  ventricular filling pressures are normal.  Mildly decreased right ventricular systolic function  The left atrium is moderately dilated, normal RA size.  The tricuspid valve is not well visualized.  No tricuspid regurgitation.  The aortic valve is not well visualized due to patient habitus. The valve  appears distorted and  thickened. Cannot establish number of cusps.  Moderate valvular aortic stenosis. Mean gradient 31 mmHg, JAMILA estimated 1 cm2.  DI 0.27.  There is trace aortic regurgitation.    ASSESSMENT/PLAN:    #Congenital aortic stenosis and ? VSD   -asked patient to attempt to acquire operative report. He says born with tricuspid and was surgically altered to bicuspid, now with moderate aortic stenosis, asymptomatic   -has findings of connective tissue dx on physical exam, suspect his MSK issues are related to this so would like to scan his aorta as well for aneurysmal disease and coarctation rule out (do not suspect as good perfusion to his legs)  -MRA chest at [facility] with cardiac MRI     Follow up by phone visit in 3 months to review.    Dagmar Mathew MD MSc  [facility]      PAST MEDICAL HISTORY  Past Medical History:   Diagnosis Date     Congenital stenosis of aortic valve        CURRENT MEDICATIONS  Current Outpatient Medications   Medication Sig Dispense Refill     ibuprofen (ADVIL/MOTRIN) 200 MG capsule Take 200 mg by mouth as needed for fever       amoxicillin (AMOXIL) 500 MG capsule 2000 mg before dental or procedures (Patient not taking: Reported on 12/14/2020) 12 capsule 0       PAST SURGICAL HISTORY:  Past Surgical History:   Procedure Laterality Date     CARDIAC SURGERY      open heart at age 5, fixed aortic stenosis but now bicuspid valve.       ALLERGIES   No Known Allergies    FAMILY HISTORY  Family History   Problem Relation Age of Onset     Uterine Cancer Mother      Breast Cancer Mother      Arthritis Mother      Other - See Comments Father         degenerative disc disease     Depression Father      Alcoholism Father      Pancreatic Cancer Brother 53     Alcoholism Brother        SOCIAL HISTORY  Social History     Socioeconomic History     Marital status: Single     Spouse name: Not on file     Number of children: Not on file     Years of education: Not on file     Highest education level: Not on  "file   Occupational History     Not on file   Social Needs     Financial resource strain: Not on file     Food insecurity     Worry: Not on file     Inability: Not on file     Transportation needs     Medical: Not on file     Non-medical: Not on file   Tobacco Use     Smoking status: Current Every Day Smoker     Types: Cigarettes     Smokeless tobacco: Never Used   Substance and Sexual Activity     Alcohol use: Yes     Comment: social, very rare     Drug use: Never     Sexual activity: Yes   Lifestyle     Physical activity     Days per week: Not on file     Minutes per session: Not on file     Stress: Not on file   Relationships     Social connections     Talks on phone: Not on file     Gets together: Not on file     Attends Faith service: Not on file     Active member of club or organization: Not on file     Attends meetings of clubs or organizations: Not on file     Relationship status: Not on file     Intimate partner violence     Fear of current or ex partner: Not on file     Emotionally abused: Not on file     Physically abused: Not on file     Forced sexual activity: Not on file   Other Topics Concern     Parent/sibling w/ CABG, MI or angioplasty before 65F 55M? Not Asked   Social History Narrative     Not on file       ROS:   Constitutional: No fever, chills, or sweats. No weight gain/loss   ENT: No visual disturbance, ear ache, epistaxis, sore throat  Allergies/Immunologic: Negative  Respiratory: No cough, hemoptysia  Cardiovascular: As per HPI  GI: No nausea, vomiting, hematemesis, melena, or hematochezia  : No urinary frequency, dysuria, or hematuria  Integument: Negative  Psychiatric: Negative  Neuro: Negative  Endocrinology: Negative   Musculoskeletal: See HPI   Vascular: No walking impairment, claudication, ischemic rest pain or nonhealing wounds    EXAM:  /82 (BP Location: Right arm, Patient Position: Fowlers, Cuff Size: Adult Large)   Pulse 88   Ht 1.778 m (5' 10\")   Wt 119.4 kg (263 lb " 3.2 oz)   SpO2 97%   BMI 37.77 kg/m    In general, the patient is a pleasant male in no apparent distress.    HEENT: NC/AT.  PERRLA.  EOMI.  Sclerae white, not injected.  Nares clear.  Pharynx without erythema or exudate.  Dentition intact.    Neck: No adenopathy.  No thyromegaly. Carotids +2/2 bilaterally without bruits.  No jugular venous distension.   Heart: RRR. Normal S1, S2 splits physiologically. II/VI LORIE, no rub, click, or gallop. The PMI is in the 5th ICS in the midclavicular line. There is no heave.    Lungs: CTA.  No ronchi, wheezes, rales.  No dullness to percussion.   Abdomen: Soft, nontender, nondistended. No organomegaly. No AAA.  No bruits.   Extremities: No clubbing, cyanosis, or edema.  No wounds. No varicose veins signs of chronic venous insufficiency.   Vascular: No bruits are noted.    Labs:  LIPID RESULTS:  No results found for: CHOL, HDL, LDL, TRIG, CHOLHDLRATIO, NHDL    LIVER ENZYME RESULTS:  Lab Results   Component Value Date    AST 18 10/30/2020    ALT 39 10/30/2020       CBC RESULTS:  Lab Results   Component Value Date    WBC 12.0 (H) 10/30/2020    RBC 5.11 10/30/2020    HGB 14.5 10/30/2020    HCT 44.5 10/30/2020    MCV 87 10/30/2020    MCH 28.4 10/30/2020    MCHC 32.6 10/30/2020    RDW 13.6 10/30/2020     10/30/2020       BMP RESULTS:  Lab Results   Component Value Date     10/30/2020    POTASSIUM 3.7 10/30/2020    CHLORIDE 109 10/30/2020    CO2 27 10/30/2020    ANIONGAP 5 10/30/2020     (H) 10/30/2020    BUN 9 10/30/2020    CR 0.79 10/30/2020    GFRESTIMATED >90 10/30/2020    GFRESTBLACK >90 10/30/2020    RAMIRO 8.9 10/30/2020        A1C RESULTS:  No results found for: A1C          Thank you for allowing me to participate in the care of your patient.      Sincerely,     Dagmar Mathew MD     [facility]    cc:   Jeremy Sanchez MD  [address] [place] [address]

## 2021-01-07 NOTE — PATIENT INSTRUCTIONS
1. MRI and MRA at [facility]   2. Follow up with Dr. Mathew in 3 months (virtual visit)

## 2021-01-07 NOTE — LETTER
1/7/2021    Jeremy Sanchez MD  [address] [place] [address]    RE: Nicolas OENIL Erasmo       Dear Colleague,    I had the pleasure of seeing Nicolas Champagneule in the [facility].    HPI:     This is a 45 year old male here for evaluation of bicuspid valve. In 1980 he had open heart surgery at age 5 for aortic stenosis at [facility]. Reports he had a tricuspid valve that was not opening well and surgeon turned tricuspid valve into bicuspid valve. Also possible spontaneously resolved muscular VSD.     Family history negative for SCD or unexplained heart attacks, aortic disease.     On ROS: no lens dislocation, normal eye-width, normal uvula, normal palate, has small hypermobility in thumb joints, no skin translucency, normal skin, hernias, no abnormal wound healing, easy bruising or bleeding, no chest wall deformities, no dental crowding, normal stature. No scoliosis. Has chronic back pain and recurrent joint issues however. Uses a cane to walk.     Echocardiogram images reviewed in clinic in detail with patient present: agree with below report, probable bicuspid valve.     Repaired congenital heart disease. Aortic stenosis and tricuspid valve  repaired in childhood per patient, details not available.  The left ventricle is normal in size.  Mid septal divit, surrounding hypokinesis, may represent repaired VSD or  spontaniously closed muscular VSD  There is mild to moderate concentric left ventricular hypertrophy.  The visual ejection fraction is estimated at 60-65%.  Grade I or early diastolic dysfunction.  Diastolic Doppler findings (E/E' ratio and/or other parameters) suggest left  ventricular filling pressures are normal.  Mildly decreased right ventricular systolic function  The left atrium is moderately dilated, normal RA size.  The tricuspid valve is not well visualized.  No tricuspid regurgitation.  The aortic valve is not well visualized due to patient habitus. The valve  appears  distorted and thickened. Cannot establish number of cusps.  Moderate valvular aortic stenosis. Mean gradient 31 mmHg, JAMILA estimated 1 cm2.  DI 0.27.  There is trace aortic regurgitation.    ASSESSMENT/PLAN:    #Congenital aortic stenosis and ? VSD   -asked patient to attempt to acquire operative report. He says born with tricuspid and was surgically altered to bicuspid, now with moderate aortic stenosis, asymptomatic   -has findings of connective tissue dx on physical exam, suspect his MSK issues are related to this so would like to scan his aorta as well for aneurysmal disease and coarctation rule out (do not suspect as good perfusion to his legs)  -MRA chest at [facility] with cardiac MRI     Follow up by phone visit in 3 months to review.    Dagmar Mathew MD MSc  [facility]      PAST MEDICAL HISTORY  Past Medical History:   Diagnosis Date     Congenital stenosis of aortic valve        CURRENT MEDICATIONS  Current Outpatient Medications   Medication Sig Dispense Refill     ibuprofen (ADVIL/MOTRIN) 200 MG capsule Take 200 mg by mouth as needed for fever       amoxicillin (AMOXIL) 500 MG capsule 2000 mg before dental or procedures (Patient not taking: Reported on 12/14/2020) 12 capsule 0       PAST SURGICAL HISTORY:  Past Surgical History:   Procedure Laterality Date     CARDIAC SURGERY      open heart at age 5, fixed aortic stenosis but now bicuspid valve.       ALLERGIES   No Known Allergies    FAMILY HISTORY  Family History   Problem Relation Age of Onset     Uterine Cancer Mother      Breast Cancer Mother      Arthritis Mother      Other - See Comments Father         degenerative disc disease     Depression Father      Alcoholism Father      Pancreatic Cancer Brother 53     Alcoholism Brother        SOCIAL HISTORY  Social History     Socioeconomic History     Marital status: Single     Spouse name: Not on file     Number of children: Not on file     Years of education: Not on file     Highest education  "level: Not on file   Occupational History     Not on file   Social Needs     Financial resource strain: Not on file     Food insecurity     Worry: Not on file     Inability: Not on file     Transportation needs     Medical: Not on file     Non-medical: Not on file   Tobacco Use     Smoking status: Current Every Day Smoker     Types: Cigarettes     Smokeless tobacco: Never Used   Substance and Sexual Activity     Alcohol use: Yes     Comment: social, very rare     Drug use: Never     Sexual activity: Yes   Lifestyle     Physical activity     Days per week: Not on file     Minutes per session: Not on file     Stress: Not on file   Relationships     Social connections     Talks on phone: Not on file     Gets together: Not on file     Attends Islam service: Not on file     Active member of club or organization: Not on file     Attends meetings of clubs or organizations: Not on file     Relationship status: Not on file     Intimate partner violence     Fear of current or ex partner: Not on file     Emotionally abused: Not on file     Physically abused: Not on file     Forced sexual activity: Not on file   Other Topics Concern     Parent/sibling w/ CABG, MI or angioplasty before 65F 55M? Not Asked   Social History Narrative     Not on file       ROS:   Constitutional: No fever, chills, or sweats. No weight gain/loss   ENT: No visual disturbance, ear ache, epistaxis, sore throat  Allergies/Immunologic: Negative  Respiratory: No cough, hemoptysia  Cardiovascular: As per HPI  GI: No nausea, vomiting, hematemesis, melena, or hematochezia  : No urinary frequency, dysuria, or hematuria  Integument: Negative  Psychiatric: Negative  Neuro: Negative  Endocrinology: Negative   Musculoskeletal: See HPI   Vascular: No walking impairment, claudication, ischemic rest pain or nonhealing wounds    EXAM:  /82 (BP Location: Right arm, Patient Position: Fowlers, Cuff Size: Adult Large)   Pulse 88   Ht 1.778 m (5' 10\")   Wt " 119.4 kg (263 lb 3.2 oz)   SpO2 97%   BMI 37.77 kg/m    In general, the patient is a pleasant male in no apparent distress.    HEENT: NC/AT.  PERRLA.  EOMI.  Sclerae white, not injected.  Nares clear.  Pharynx without erythema or exudate.  Dentition intact.    Neck: No adenopathy.  No thyromegaly. Carotids +2/2 bilaterally without bruits.  No jugular venous distension.   Heart: RRR. Normal S1, S2 splits physiologically. II/VI LORIE, no rub, click, or gallop. The PMI is in the 5th ICS in the midclavicular line. There is no heave.    Lungs: CTA.  No ronchi, wheezes, rales.  No dullness to percussion.   Abdomen: Soft, nontender, nondistended. No organomegaly. No AAA.  No bruits.   Extremities: No clubbing, cyanosis, or edema.  No wounds. No varicose veins signs of chronic venous insufficiency.   Vascular: No bruits are noted.    Labs:  LIPID RESULTS:  No results found for: CHOL, HDL, LDL, TRIG, CHOLHDLRATIO, NHDL    LIVER ENZYME RESULTS:  Lab Results   Component Value Date    AST 18 10/30/2020    ALT 39 10/30/2020       CBC RESULTS:  Lab Results   Component Value Date    WBC 12.0 (H) 10/30/2020    RBC 5.11 10/30/2020    HGB 14.5 10/30/2020    HCT 44.5 10/30/2020    MCV 87 10/30/2020    MCH 28.4 10/30/2020    MCHC 32.6 10/30/2020    RDW 13.6 10/30/2020     10/30/2020       BMP RESULTS:  Lab Results   Component Value Date     10/30/2020    POTASSIUM 3.7 10/30/2020    CHLORIDE 109 10/30/2020    CO2 27 10/30/2020    ANIONGAP 5 10/30/2020     (H) 10/30/2020    BUN 9 10/30/2020    CR 0.79 10/30/2020    GFRESTIMATED >90 10/30/2020    GFRESTBLACK >90 10/30/2020    RAMIRO 8.9 10/30/2020        A1C RESULTS:  No results found for: A1C      Thank you for allowing me to participate in the care of your patient.    Sincerely,     Dagmar Mathew MD     [facility]

## 2021-01-07 NOTE — PROGRESS NOTES
HPI:     This is a 45 year old male here for evaluation of bicuspid valve. In 1980 he had open heart surgery at age 5 for aortic stenosis at [facility]. Reports he had a tricuspid valve that was not opening well and surgeon turned tricuspid valve into bicuspid valve. Also possible spontaneously resolved muscular VSD.     Family history negative for SCD or unexplained heart attacks, aortic disease.     On ROS: no lens dislocation, normal eye-width, normal uvula, normal palate, has small hypermobility in thumb joints, no skin translucency, normal skin, hernias, no abnormal wound healing, easy bruising or bleeding, no chest wall deformities, no dental crowding, normal stature. No scoliosis. Has chronic back pain and recurrent joint issues however. Uses a cane to walk.     Echocardiogram images reviewed in clinic in detail with patient present: agree with below report, probable bicuspid valve.     Repaired congenital heart disease. Aortic stenosis and tricuspid valve  repaired in childhood per patient, details not available.  The left ventricle is normal in size.  Mid septal divit, surrounding hypokinesis, may represent repaired VSD or  spontaniously closed muscular VSD  There is mild to moderate concentric left ventricular hypertrophy.  The visual ejection fraction is estimated at 60-65%.  Grade I or early diastolic dysfunction.  Diastolic Doppler findings (E/E' ratio and/or other parameters) suggest left  ventricular filling pressures are normal.  Mildly decreased right ventricular systolic function  The left atrium is moderately dilated, normal RA size.  The tricuspid valve is not well visualized.  No tricuspid regurgitation.  The aortic valve is not well visualized due to patient habitus. The valve  appears distorted and thickened. Cannot establish number of cusps.  Moderate valvular aortic stenosis. Mean gradient 31 mmHg, JAMILA estimated 1 cm2.  DI 0.27.  There is trace aortic  regurgitation.    ASSESSMENT/PLAN:    #Congenital aortic stenosis and ? VSD   -asked patient to attempt to acquire operative report. He says born with tricuspid and was surgically altered to bicuspid, now with moderate aortic stenosis, asymptomatic   -has findings of connective tissue dx on physical exam, suspect his MSK issues are related to this so would like to scan his aorta as well for aneurysmal disease and coarctation rule out (do not suspect as good perfusion to his legs)  -MRA chest at [facility] with cardiac MRI     Follow up by phone visit in 3 months to review.    Dagmar Mathew MD MSc  [facility]      PAST MEDICAL HISTORY  Past Medical History:   Diagnosis Date     Congenital stenosis of aortic valve        CURRENT MEDICATIONS  Current Outpatient Medications   Medication Sig Dispense Refill     ibuprofen (ADVIL/MOTRIN) 200 MG capsule Take 200 mg by mouth as needed for fever       amoxicillin (AMOXIL) 500 MG capsule 2000 mg before dental or procedures (Patient not taking: Reported on 12/14/2020) 12 capsule 0       PAST SURGICAL HISTORY:  Past Surgical History:   Procedure Laterality Date     CARDIAC SURGERY      open heart at age 5, fixed aortic stenosis but now bicuspid valve.       ALLERGIES   No Known Allergies    FAMILY HISTORY  Family History   Problem Relation Age of Onset     Uterine Cancer Mother      Breast Cancer Mother      Arthritis Mother      Other - See Comments Father         degenerative disc disease     Depression Father      Alcoholism Father      Pancreatic Cancer Brother 53     Alcoholism Brother        SOCIAL HISTORY  Social History     Socioeconomic History     Marital status: Single     Spouse name: Not on file     Number of children: Not on file     Years of education: Not on file     Highest education level: Not on file   Occupational History     Not on file   Social Needs     Financial resource strain: Not on file     Food insecurity     Worry: Not on file     Inability:  "Not on file     Transportation needs     Medical: Not on file     Non-medical: Not on file   Tobacco Use     Smoking status: Current Every Day Smoker     Types: Cigarettes     Smokeless tobacco: Never Used   Substance and Sexual Activity     Alcohol use: Yes     Comment: social, very rare     Drug use: Never     Sexual activity: Yes   Lifestyle     Physical activity     Days per week: Not on file     Minutes per session: Not on file     Stress: Not on file   Relationships     Social connections     Talks on phone: Not on file     Gets together: Not on file     Attends Sabianist service: Not on file     Active member of club or organization: Not on file     Attends meetings of clubs or organizations: Not on file     Relationship status: Not on file     Intimate partner violence     Fear of current or ex partner: Not on file     Emotionally abused: Not on file     Physically abused: Not on file     Forced sexual activity: Not on file   Other Topics Concern     Parent/sibling w/ CABG, MI or angioplasty before 65F 55M? Not Asked   Social History Narrative     Not on file       ROS:   Constitutional: No fever, chills, or sweats. No weight gain/loss   ENT: No visual disturbance, ear ache, epistaxis, sore throat  Allergies/Immunologic: Negative  Respiratory: No cough, hemoptysia  Cardiovascular: As per HPI  GI: No nausea, vomiting, hematemesis, melena, or hematochezia  : No urinary frequency, dysuria, or hematuria  Integument: Negative  Psychiatric: Negative  Neuro: Negative  Endocrinology: Negative   Musculoskeletal: See HPI   Vascular: No walking impairment, claudication, ischemic rest pain or nonhealing wounds    EXAM:  /82 (BP Location: Right arm, Patient Position: Fowlers, Cuff Size: Adult Large)   Pulse 88   Ht 1.778 m (5' 10\")   Wt 119.4 kg (263 lb 3.2 oz)   SpO2 97%   BMI 37.77 kg/m    In general, the patient is a pleasant male in no apparent distress.    HEENT: NC/AT.  LANNY.  SARAHMI.  Sclerae white, " not injected.  Nares clear.  Pharynx without erythema or exudate.  Dentition intact.    Neck: No adenopathy.  No thyromegaly. Carotids +2/2 bilaterally without bruits.  No jugular venous distension.   Heart: RRR. Normal S1, S2 splits physiologically. II/VI LORIE, no rub, click, or gallop. The PMI is in the 5th ICS in the midclavicular line. There is no heave.    Lungs: CTA.  No ronchi, wheezes, rales.  No dullness to percussion.   Abdomen: Soft, nontender, nondistended. No organomegaly. No AAA.  No bruits.   Extremities: No clubbing, cyanosis, or edema.  No wounds. No varicose veins signs of chronic venous insufficiency.   Vascular: No bruits are noted.    Labs:  LIPID RESULTS:  No results found for: CHOL, HDL, LDL, TRIG, CHOLHDLRATIO, NHDL    LIVER ENZYME RESULTS:  Lab Results   Component Value Date    AST 18 10/30/2020    ALT 39 10/30/2020       CBC RESULTS:  Lab Results   Component Value Date    WBC 12.0 (H) 10/30/2020    RBC 5.11 10/30/2020    HGB 14.5 10/30/2020    HCT 44.5 10/30/2020    MCV 87 10/30/2020    MCH 28.4 10/30/2020    MCHC 32.6 10/30/2020    RDW 13.6 10/30/2020     10/30/2020       BMP RESULTS:  Lab Results   Component Value Date     10/30/2020    POTASSIUM 3.7 10/30/2020    CHLORIDE 109 10/30/2020    CO2 27 10/30/2020    ANIONGAP 5 10/30/2020     (H) 10/30/2020    BUN 9 10/30/2020    CR 0.79 10/30/2020    GFRESTIMATED >90 10/30/2020    GFRESTBLACK >90 10/30/2020    RAMIRO 8.9 10/30/2020        A1C RESULTS:  No results found for: A1C

## 2021-01-18 ENCOUNTER — THERAPY VISIT (OUTPATIENT)
Dept: CHIROPRACTIC MEDICINE | Facility: CLINIC | Age: 46
End: 2021-01-18
Payer: COMMERCIAL

## 2021-01-18 DIAGNOSIS — M99.04 SEGMENTAL DYSFUNCTION OF SACRAL REGION: ICD-10-CM

## 2021-01-18 DIAGNOSIS — M99.03 SEGMENTAL DYSFUNCTION OF LUMBAR REGION: Primary | ICD-10-CM

## 2021-01-18 DIAGNOSIS — M54.9 MECHANICAL BACK PAIN: ICD-10-CM

## 2021-01-18 DIAGNOSIS — M99.02 SEGMENTAL DYSFUNCTION OF THORACIC REGION: ICD-10-CM

## 2021-01-18 PROCEDURE — 98941 CHIROPRACT MANJ 3-4 REGIONS: CPT | Mod: AT | Performed by: CHIROPRACTOR

## 2021-01-18 NOTE — PROGRESS NOTES
"Visit #:  3 of 8 based on treatment plan 12/21/2020    Subjective:  Nicolas Zimmerman is a 45 year old male who is seen in f/u up for:        Segmental dysfunction of thoracic region  Segmental dysfunction of lumbar region  Segmental dysfunction of sacral region  Mechanical back pain.     Since last visit on 12/28/2020,  Nicolas iZmmerman reports the following changes: Patient presents and states that it has been a rough couple of weeks. He has had pain from his lower ribs down to his SI joints, and \"all of his joints have been acting up.\" He states that 2 weeks was too long for him to go. He ended up doing some shopping last week and missed his appt. He rates his current pain 5/10 today but it has been \"really bad\" the last couple weeks. He does state that \"so far he is better today but it will probably get worse.\" He tries to stretch a lot and get his body to move.        Objective:  The following was observed:    P: palpatory tenderness neck and throughout back    A: static palpation demonstrates intersegmental asymmetry     R: motion palpation notes restricted motion    T: localized muscle spasm at: T-spine paraspinal and Traps Bilaterally      Assessment:    RX ordered/plan of care: Mechanical back pain likely due to previous trauma, with associated myospasm and intersegmental dysfunction.  Anticipated outcomes: Patient is expected to get some relief with care.  Possible risks and side effects: Minimal soreness expected post-adjustment.     Segmental spinal dysfunction/restrictions found at:  T5 E, FR  T10 E, FR  L4 RR, LRR  Left SI posterior  Flexion Distraction of lumbar spine.     Diagnoses:      1. Segmental dysfunction of thoracic region    2. Segmental dysfunction of lumbar region    3. Segmental dysfunction of sacral region    4. Mechanical back pain        Patient's condition:  Patient had restrictions pre-manipulation and Patient had decreased motion prior to manipulation    Treatment effectiveness:  Post " manipulation there is better intersegmental movement and Patient claims to feel looser post manipulation      Procedures:  CMT:  75502 Chiropractic manipulative treatment 3-4 regions performed   Thoracic: Mobilization, T5. T10, Prone  Lumbar: Drop Table, L4, Prone  Pelvis: Drop Table, PSIS Left , Prone  Flexion Distraction of lumbar spine.     Modalities:  33756: MSTM:  To Lumbar erector spine, T-spine paraspinal and Traps  for 5 min HYPERVOLT    Therapeutic procedures:  Gave patient Ice instructions post adjustment, and instructions for acute care      Prognosis: Guarded    Progress towards Goals:   Decrease pain in thoracic and lower back.  Promote general health and improve posture.  Establish care.       Response to Treatment:   Reduction of symptoms with care, but maybe 3 weeks was too long.       Recommendations:    Instructions:ice 20 minutes every other hour as needed, heat 15 minutes every other hour as needed and stretch as instructed at visit    Follow-up:  Return to care in 1-2 weeks.

## 2021-01-25 ENCOUNTER — THERAPY VISIT (OUTPATIENT)
Dept: CHIROPRACTIC MEDICINE | Facility: CLINIC | Age: 46
End: 2021-01-25
Payer: COMMERCIAL

## 2021-01-25 DIAGNOSIS — M99.03 SEGMENTAL DYSFUNCTION OF LUMBAR REGION: Primary | ICD-10-CM

## 2021-01-25 DIAGNOSIS — M99.02 SEGMENTAL DYSFUNCTION OF THORACIC REGION: ICD-10-CM

## 2021-01-25 DIAGNOSIS — M99.04 SEGMENTAL DYSFUNCTION OF SACRAL REGION: ICD-10-CM

## 2021-01-25 DIAGNOSIS — M54.9 MECHANICAL BACK PAIN: ICD-10-CM

## 2021-01-25 PROCEDURE — 98941 CHIROPRACT MANJ 3-4 REGIONS: CPT | Mod: AT | Performed by: CHIROPRACTOR

## 2021-01-25 NOTE — PROGRESS NOTES
Visit #:  4 of 8 based on treatment plan 12/21/2020    Subjective:  Nicolas Zimmerman is a 45 year old male who is seen in f/u up for:        Segmental dysfunction of thoracic region  Segmental dysfunction of lumbar region  Segmental dysfunction of sacral region  Mechanical back pain.     Since last visit on 1/18/2021,  Nicolas Zimmerman reports the following changes: Patient presents and states that it has been a rough couple of days. He is having issues with his back, but he is having more issues with bleeding with bowel movements. He had a colonoscopy and said it was a hemorrhoid. He says it is concerning but they don't think it is a problem. His tummy issues have been exacerbating his back issues. He has had a lot of shoulder and hip issues this week. He is trying to find a routine for stretching and yoga type things a few times per day, but this week was a bad week. He rates his current pain 6/10.      Objective:  The following was observed:    P: palpatory tenderness neck and throughout back    A: static palpation demonstrates intersegmental asymmetry     R: motion palpation notes restricted motion    T: localized muscle spasm at: T-spine paraspinal and Traps Bilaterally      Assessment:    RX ordered/plan of care: Mechanical back pain likely due to previous trauma, with associated myospasm and intersegmental dysfunction.  Anticipated outcomes: Patient is expected to get some relief with care.  Possible risks and side effects: Minimal soreness expected post-adjustment.     Segmental spinal dysfunction/restrictions found at:  T5 E, FR  T10 E, FR  L4 RR, LRR  Left SI posterior  Flexion Distraction of lumbar spine.     Diagnoses:      1. Segmental dysfunction of thoracic region    2. Segmental dysfunction of lumbar region    3. Segmental dysfunction of sacral region    4. Mechanical back pain        Patient's condition:  Patient had restrictions pre-manipulation and Patient had decreased motion prior to  manipulation    Treatment effectiveness:  Post manipulation there is better intersegmental movement and Patient claims to feel looser post manipulation      Procedures:  CMT:  71110 Chiropractic manipulative treatment 3-4 regions performed   Thoracic: Mobilization, T5, T10, Prone  Lumbar: Drop Table, L4, Prone  Pelvis: Drop Table, PSIS Left , Prone  Flexion Distraction of lumbar spine.     Modalities:  04427: MSTM:  To Lumbar erector spine, T-spine paraspinal and Traps  for 5 min HYPERVOLT    Therapeutic procedures:  Gave patient Ice instructions post adjustment, and instructions for acute care      Prognosis: Guarded    Progress towards Goals:   Decrease pain in thoracic and lower back.  Promote general health and improve posture.  Establish care.       Response to Treatment:   Reduction of symptoms with care, but maybe 3 weeks was too long.       Recommendations:    Instructions:ice 20 minutes every other hour as needed, heat 15 minutes every other hour as needed and stretch as instructed at visit    Follow-up:  Return to care if needed, but patient was made aware that [facility] is discontinuing chiropractic services and was suggested patient research providers in the area.

## 2022-06-02 ENCOUNTER — HOSPITAL ENCOUNTER (EMERGENCY)
Facility: CLINIC | Age: 47
Discharge: HOME | End: 2022-06-02
Attending: EMERGENCY MEDICINE | Admitting: EMERGENCY MEDICINE
Payer: COMMERCIAL

## 2022-06-02 ENCOUNTER — OFFICE VISIT (OUTPATIENT)
Dept: FAMILY MEDICINE | Facility: CLINIC | Age: 47
End: 2022-06-02
Payer: COMMERCIAL

## 2022-06-02 VITALS
DIASTOLIC BLOOD PRESSURE: 78 MMHG | WEIGHT: 234.8 LBS | TEMPERATURE: 98.3 F | HEIGHT: 70 IN | HEART RATE: 84 BPM | SYSTOLIC BLOOD PRESSURE: 122 MMHG | OXYGEN SATURATION: 97 % | BODY MASS INDEX: 33.61 KG/M2 | RESPIRATION RATE: 20 BRPM

## 2022-06-02 VITALS
WEIGHT: 237 LBS | BODY MASS INDEX: 34.5 KG/M2 | SYSTOLIC BLOOD PRESSURE: 114 MMHG | TEMPERATURE: 98.4 F | RESPIRATION RATE: 13 BRPM | DIASTOLIC BLOOD PRESSURE: 80 MMHG | HEART RATE: 66 BPM | OXYGEN SATURATION: 97 %

## 2022-06-02 DIAGNOSIS — Z13.220 SCREENING FOR HYPERLIPIDEMIA: ICD-10-CM

## 2022-06-02 DIAGNOSIS — Z76.89 ENCOUNTER TO ESTABLISH CARE: ICD-10-CM

## 2022-06-02 DIAGNOSIS — Z72.0 TOBACCO ABUSE: ICD-10-CM

## 2022-06-02 DIAGNOSIS — R07.89 CHEST DISCOMFORT: ICD-10-CM

## 2022-06-02 DIAGNOSIS — F41.1 GAD (GENERALIZED ANXIETY DISORDER): ICD-10-CM

## 2022-06-02 DIAGNOSIS — R07.9 CHEST PAIN, UNSPECIFIED TYPE: Primary | ICD-10-CM

## 2022-06-02 DIAGNOSIS — R06.02 SOB (SHORTNESS OF BREATH): ICD-10-CM

## 2022-06-02 LAB
ANION GAP SERPL CALCULATED.3IONS-SCNC: 5 MMOL/L (ref 3–14)
BUN SERPL-MCNC: 10 MG/DL (ref 7–30)
CALCIUM SERPL-MCNC: 8.8 MG/DL (ref 8.5–10.1)
CHLORIDE BLD-SCNC: 111 MMOL/L (ref 94–109)
CHOLEST SERPL-MCNC: 107 MG/DL
CO2 SERPL-SCNC: 25 MMOL/L (ref 20–32)
CREAT SERPL-MCNC: 0.83 MG/DL (ref 0.66–1.25)
ERYTHROCYTE [DISTWIDTH] IN BLOOD BY AUTOMATED COUNT: 13.3 % (ref 10–15)
FASTING STATUS PATIENT QL REPORTED: YES
GFR SERPL CREATININE-BSD FRML MDRD: >90 ML/MIN/1.73M2
GLUCOSE BLD-MCNC: 93 MG/DL (ref 70–99)
HCT VFR BLD AUTO: 44 % (ref 40–53)
HDLC SERPL-MCNC: 39 MG/DL
HGB BLD-MCNC: 14.9 G/DL (ref 13.3–17.7)
LDLC SERPL CALC-MCNC: 57 MG/DL
MCH RBC QN AUTO: 28.7 PG (ref 26.5–33)
MCHC RBC AUTO-ENTMCNC: 33.9 G/DL (ref 31.5–36.5)
MCV RBC AUTO: 85 FL (ref 78–100)
NONHDLC SERPL-MCNC: 68 MG/DL
PLATELET # BLD AUTO: 241 10E3/UL (ref 150–450)
POTASSIUM BLD-SCNC: 4 MMOL/L (ref 3.4–5.3)
RBC # BLD AUTO: 5.19 10E6/UL (ref 4.4–5.9)
SODIUM SERPL-SCNC: 141 MMOL/L (ref 133–144)
TRIGL SERPL-MCNC: 56 MG/DL
TROPONIN I SERPL HS-MCNC: 12 NG/L
TROPONIN I SERPL HS-MCNC: 12 NG/L
WBC # BLD AUTO: 11.9 10E3/UL (ref 4–11)

## 2022-06-02 PROCEDURE — 84484 ASSAY OF TROPONIN QUANT: CPT | Performed by: EMERGENCY MEDICINE

## 2022-06-02 PROCEDURE — 36415 COLL VENOUS BLD VENIPUNCTURE: CPT | Performed by: EMERGENCY MEDICINE

## 2022-06-02 PROCEDURE — 84484 ASSAY OF TROPONIN QUANT: CPT | Performed by: STUDENT IN AN ORGANIZED HEALTH CARE EDUCATION/TRAINING PROGRAM

## 2022-06-02 PROCEDURE — 99284 EMERGENCY DEPT VISIT MOD MDM: CPT | Performed by: EMERGENCY MEDICINE

## 2022-06-02 PROCEDURE — 99214 OFFICE O/P EST MOD 30 MIN: CPT | Performed by: STUDENT IN AN ORGANIZED HEALTH CARE EDUCATION/TRAINING PROGRAM

## 2022-06-02 PROCEDURE — 93010 ELECTROCARDIOGRAM REPORT: CPT | Mod: 77 | Performed by: EMERGENCY MEDICINE

## 2022-06-02 PROCEDURE — 36415 COLL VENOUS BLD VENIPUNCTURE: CPT | Performed by: STUDENT IN AN ORGANIZED HEALTH CARE EDUCATION/TRAINING PROGRAM

## 2022-06-02 PROCEDURE — 80061 LIPID PANEL: CPT | Performed by: STUDENT IN AN ORGANIZED HEALTH CARE EDUCATION/TRAINING PROGRAM

## 2022-06-02 PROCEDURE — 80048 BASIC METABOLIC PNL TOTAL CA: CPT | Performed by: STUDENT IN AN ORGANIZED HEALTH CARE EDUCATION/TRAINING PROGRAM

## 2022-06-02 PROCEDURE — 93005 ELECTROCARDIOGRAM TRACING: CPT | Performed by: EMERGENCY MEDICINE

## 2022-06-02 PROCEDURE — 99284 EMERGENCY DEPT VISIT MOD MDM: CPT | Mod: 25 | Performed by: EMERGENCY MEDICINE

## 2022-06-02 PROCEDURE — 85027 COMPLETE CBC AUTOMATED: CPT | Performed by: STUDENT IN AN ORGANIZED HEALTH CARE EDUCATION/TRAINING PROGRAM

## 2022-06-02 PROCEDURE — 93000 ELECTROCARDIOGRAM COMPLETE: CPT | Performed by: STUDENT IN AN ORGANIZED HEALTH CARE EDUCATION/TRAINING PROGRAM

## 2022-06-02 RX ORDER — NITROGLYCERIN 0.4 MG/1
0.4 TABLET SUBLINGUAL EVERY 5 MIN PRN
Status: DISCONTINUED | OUTPATIENT
Start: 2022-06-02 | End: 2022-06-02

## 2022-06-02 ASSESSMENT — ENCOUNTER SYMPTOMS
NERVOUS/ANXIOUS: 1
HALLUCINATIONS: 0

## 2022-06-02 ASSESSMENT — PAIN SCALES - GENERAL: PAINLEVEL: MODERATE PAIN (4)

## 2022-06-02 NOTE — DISCHARGE INSTRUCTIONS
-Order has been placed for outpatient cardiac stress test.  You should receive a phone call to set up appointment.  -You will need to arrange for follow-up appointment with your primary care provider in the clinic.  They can go over the test results with you.

## 2022-06-02 NOTE — NURSING NOTE
Health Maintenance Due   Topic Date Due     PREVENTIVE CARE VISIT  Never done     ANNUAL REVIEW OF HM ORDERS  Never done     ADVANCE CARE PLANNING  Never done     COVID-19 Vaccine (1) Never done     Pneumococcal Vaccine: Pediatrics (0 to 5 Years) and At-Risk Patients (6 to 64 Years) (1 - PCV) Never done     HIV SCREENING  Never done     HEPATITIS C SCREENING  Never done     DTAP/TDAP/TD IMMUNIZATION (1 - Tdap) Never done     LIPID  Never done     Mitzi KING LPN

## 2022-06-02 NOTE — ED TRIAGE NOTES
"Pt was at the clinic and was told he had an abnormal EKG so he needed to be seen in the ER, pt reports he passed out at home a couple of weeks ago and has been having \"pressure\", in the chest area, no hx of heart issues     Triage Assessment     Row Name 06/02/22 1447       Triage Assessment (Adult)    Airway WDL WDL              "

## 2022-06-02 NOTE — ED PROVIDER NOTES
History     Chief Complaint   Patient presents with     Chest Pain     HPI     Nicolas Zimmerman is a 46 year old male who significant past medical history for prior cardiac surgery for congenital aortic stenosis.  Corrected by converting tricuspid to bicuspid valve.  Recently is been having chest discomfort.  Admits to having a lot of increased emotional stress causing the lot of somatic symptoms.  Has had no syncope, palpitations.  Symptoms are not triggered by physical activity.  Does have lots of aches and pains with physical activity.  States he has not been taking good care of himself in his 40s.  Does not use excessive alcohol.  Positive tobacco use disorder.  Patient reports less active lifestyle and has gained weight.  He was seen in the clinic today were EKG shows subtle nonspecific changes in the lateral leads.  His troponin was normal.  Clinic provider requested ED transfer for further evaluation.    Allergies:  No Known Allergies    Problem List:    Patient Active Problem List    Diagnosis Date Noted     Segmental dysfunction of thoracic region 12/21/2020     Priority: Medium     Segmental dysfunction of lumbar region 12/21/2020     Priority: Medium     Segmental dysfunction of sacral region 12/21/2020     Priority: Medium     Mechanical back pain 12/21/2020     Priority: Medium        Past Medical History:    Past Medical History:   Diagnosis Date     Congenital stenosis of aortic valve      Depressive disorder        Past Surgical History:    Past Surgical History:   Procedure Laterality Date     CARDIAC SURGERY      open heart at age 5, fixed aortic stenosis but now bicuspid valve.       Family History:    Family History   Problem Relation Age of Onset     Uterine Cancer Mother      Breast Cancer Mother      Arthritis Mother      Other - See Comments Father         degenerative disc disease     Depression Father      Alcoholism Father      Pancreatic Cancer Brother 53     Alcoholism Brother         Social History:  Marital Status:  Single [1]  Social History     Tobacco Use     Smoking status: Current Every Day Smoker     Types: Cigarettes     Smokeless tobacco: Never Used     Tobacco comment: 1 a day   Substance Use Topics     Alcohol use: Yes     Comment: social, very rare     Drug use: Never        Medications:    amoxicillin (AMOXIL) 500 MG capsule  ibuprofen (ADVIL/MOTRIN) 200 MG capsule          Review of Systems   Psychiatric/Behavioral: Negative for hallucinations. The patient is nervous/anxious.    All other systems reviewed and are negative.      Physical Exam   BP: (!) 122/92  Pulse: 77  Temp: 98.4  F (36.9  C)  Resp: 18  Weight: 107.5 kg (237 lb)  SpO2: 95 %      Physical Exam  Vitals and nursing note reviewed.   Constitutional:       Appearance: He is not ill-appearing.   HENT:      Head: Normocephalic.      Nose: Nose normal.   Eyes:      Conjunctiva/sclera: Conjunctivae normal.   Cardiovascular:      Rate and Rhythm: Normal rate and regular rhythm.      Heart sounds: Murmur heard.    Systolic murmur is present.  Pulmonary:      Effort: Pulmonary effort is normal.      Breath sounds: Normal breath sounds. No decreased breath sounds, wheezing, rhonchi or rales.   Abdominal:      General: Bowel sounds are normal.      Palpations: Abdomen is soft.      Comments: Obese   Skin:     General: Skin is warm.      Capillary Refill: Capillary refill takes less than 2 seconds.      Findings: No rash.   Neurological:      General: No focal deficit present.      Mental Status: He is alert and oriented to person, place, and time.   Psychiatric:         Mood and Affect: Mood normal.         Behavior: Behavior normal.         ED Course                 Procedures         EKG:  Interpretation by Yosef Early DO.   Indication:  Sinus rhythm.  Rate 73 bpm.  Does have some subtle nonspecific changes in lead I and aVL and ST segment but this remains nondiagnostic.  Also slight ST segment depression in V6.  No  reciprocal changes.  No ectopy.  Normal axis.  Impressions: Abnormal EKG             Results for orders placed or performed during the hospital encounter of 06/02/22 (from the past 24 hour(s))   Troponin I (now)   Result Value Ref Range    Troponin I High Sensitivity 12 <79 ng/L       Medications - No data to display    Assessments & Plan (with Medical Decision Making)  Chest discomfort.  Transfer to the clinic to the ED.  He has been having increased social and emotional stressors.  Appears on May 21 he had more of a conversion reaction he was trying to relax on the beach when he was very stressed emotionally.  Since that time has been having some vague discomfort in his chest.  History of for congenital aortic stenosis requiring surgery.  Positive tobacco use disorder.  When he arrived he was noted to be in a sinus rhythm.  Rate in the 70s.  No ectopy.  His EKG was repeated for that in the clinic and compared to one on file from July 2021.  He does have some nonspecific ST changes in the lateral leads of 1, aVL and V6.  Troponin the clinic was normal.  This was repeated 2 hours for the first and it remained normal.  Patient was not having any discomfort in the ED that required treatment.  He was discharged home and set up for cardiac stress echo with follow-up in the clinic for test results to be reviewed.  Discussed the risk of smoking     I have reviewed the nursing notes.    I have reviewed the findings, diagnosis, plan and need for follow up with the patient.      New Prescriptions    No medications on file       Final diagnoses:   Chest discomfort       6/2/2022   [facility] EMERGENCY DEPT     Yosef Early, DO  06/02/22 [id number]

## 2022-06-02 NOTE — PROGRESS NOTES
Assessment & Plan     Chest pain, unspecified type  Recent chest pain is concerning in setting of nonspecific ST changes from prior EKG compared to one obtained in clinic today. It does sound like he has some worsening exertional symptoms as well and does have substantial smoking hx putting him at higher risk. I did obtain labs today with troponin that was in normal ranges. With his chest pain in setting of EKG changes I did not feel comfortable sending him home without trending these further and consideration of nitroglycerin to see if this relieves his symptoms. I did speak with Dr. Early in the ED who also reviewed his EKG and agrees with sending him over for further evaluation and trending of his troponin.     SOB (shortness of breath)  - EKG 12-lead complete w/read - Clinics  - Troponin I  - Basic metabolic panel  (Ca, Cl, CO2, Creat, Gluc, K, Na, BUN)  - CBC with platelets  - Troponin I  - Basic metabolic panel  (Ca, Cl, CO2, Creat, Gluc, K, Na, BUN)  - CBC with platelets    Generalized Anxiety Disorder  MDD  Patient clearly dealing with extra stress lately which is making his anxiety worse. Recent episode concerning for panic attack with other nonspecific somatic symptoms likely stemming from this. Patient tells me he has seen a counselor in the past and it does sound like he has relaxation and meditation techniques that he uses at home and does not need to see counseling currently but will let me know if he changes his mind in the future. Does not desire any medications either.     Screening for hyperlipidemia  - Lipid panel reflex to direct LDL Fasting  - Lipid panel reflex to direct LDL Fasting    Tobacco abuse  He is working on cutting back and hopes to discontinue in the future    Encounter to establish care  Hx was reviewed.       Tobacco Cessation:   reports that he has been smoking cigarettes. He has never used smokeless tobacco.  Tobacco Cessation Action Plan: Information offered: Patient not  "interested at this time  working on cutting back himself    BMI:   Estimated body mass index is 34.18 kg/m  as calculated from the following:    Height as of this encounter: 1.765 m (5' 9.5\").    Weight as of this encounter: 106.5 kg (234 lb 12.8 oz).   Weight management plan: Discussed healthy diet and exercise guidelines    No follow-ups on file.    James Del Cid MD  [facility]    45 minutes was spent directly with patient for exam, history and review of results, another greater than 15 minutes was spent in further chart review and provider to provider communication    Subjective   Nicolas is a 46 year old who presents for the following health issues     History of Present Illness       Back Pain:  He presents for follow up of back pain. Patient's back pain is a chronic problem.  Location of back pain:  Right lower back, left lower back, right middle of back, left middle of back, right upper back, left upper back, right side of neck, left side of neck, right shoulder, right hip, right side of waist and left side of waist  Description of back pain: cramping, sharp, shooting and stabbing  Back pain spreads: right knee and right shoulder    Since patient first noticed back pain, pain is: always present, but gets better and worse  Does back pain interfere with his job:  Not applicable      Reason for visit:  Heart and joint issues  Symptom onset:  More than a month  Symptoms include:  Pain, occasional shortness of breath and dizziness  Symptom intensity:  Severe  Symptom progression:  Worsening  Had these symptoms before:  Yes  Has tried/received treatment for these symptoms:  No    He eats 2-3 servings of fruits and vegetables daily.He consumes 1 sweetened beverage(s) daily.He exercises with enough effort to increase his heart rate 30 to 60 minutes per day.  He exercises with enough effort to increase his heart rate 5 days per week.   He is taking medications regularly.     Patient here " today to establish care and wanting to get his health taking care of.  Patient is very tearful and emotional in clinic today.  Reports almost dying 2 to 3 weeks ago when he was on a beach trying to relax. Reports having significant chest pain and black out event where he lost his vision. Felt like he did not lose consciousness but could not move during that time. Reports taking almost a half hour to get back to somewhat normal where he could get up and go to his car. Has been having chest pain and tightness consistently since that time. Notes that he has poor exercise tolerance and gets winded quickly when walking around the house. Pain maybe worse in that time but hard for him to know at times as he has chronic back pain that is also worse with movement over the last several weeks worsening. He has hx of congential aortic stenosis that was surgically repaired when he was 5. No new valve but repair was done. Has not had issues with his heart since but did see cardiology over a year ago who recommended MRA for further evaluation of valve and for aneurysms which he did not follow up with. Has had a lot of stressors over the last 2 years with multiple family members with cancer and brother who passed away. Nicolas was living and help take care of him during his last several months and has not worked since last working at local [employer]. Long hx of anxiety and depression and feels like he has good coping skills at home and supportive partner. Feels like he gets tingling into his arms at times. No specific palpitations or swelling. No cough or recent respiratory illness. Other hx was reviewed. He does not have any other current medications.     Review of Systems   Constitutional, HEENT, cardiovascular, pulmonary, GI, , musculoskeletal, neuro, skin, endocrine and psych systems are negative, except as otherwise noted.      Objective    /78 (BP Location: Left arm, Patient Position: Chair, Cuff Size: Adult Large)   Pulse  "84   Temp 98.3  F (36.8  C) (Temporal)   Resp 20   Ht 1.765 m (5' 9.5\")   Wt 106.5 kg (234 lb 12.8 oz)   SpO2 97%   BMI 34.18 kg/m    Body mass index is 34.18 kg/m .  Physical Exam   GENERAL: healthy, alert and no distress  EYES: Eyes grossly normal to inspection, PERRL and conjunctivae and sclerae normal  HENT: ear canals and TM's normal, nose and mouth without ulcers or lesions  NECK: no adenopathy, no asymmetry, masses, or scars and thyroid normal to palpation  RESP: lungs clear to auscultation - no rales, rhonchi or wheezes  CV: regular rate and rhythm, normal S1 S2, systolic murmur, click or rub, no peripheral edema and peripheral pulses strong  ABDOMEN: soft, nontender, no hepatosplenomegaly, no masses and bowel sounds normal  MS: no gross musculoskeletal defects noted, no edema  SKIN: no suspicious lesions or rashes  NEURO: Normal strength and tone, mentation intact and speech normal  PSYCH: mentation appears normal, affect anxious and tearful        EKG: Regular Rate and Rhythm, Normal axis,  Appears to have slight ST depression in leads 1, aAVL and V6 compared to previous.  "

## 2022-06-03 ENCOUNTER — TELEPHONE (OUTPATIENT)
Dept: CARDIOLOGY | Facility: CLINIC | Age: 47
End: 2022-06-03
Payer: COMMERCIAL

## 2022-06-03 NOTE — TELEPHONE ENCOUNTER
[facility] Call Center    Phone Message    May a detailed message be left on voicemail: yes     Reason for Call: Appointment Intake    Referring Provider Name: Yosef Early DO  Diagnosis and/or Symptoms: echo stress test. Chest discomfort     Action Taken: Other: Mathews    Travel Screening: Not Applicable

## 2022-06-08 DIAGNOSIS — R07.9 CHEST PAIN, UNSPECIFIED TYPE: Primary | ICD-10-CM

## 2022-06-08 DIAGNOSIS — R06.02 SOB (SHORTNESS OF BREATH): ICD-10-CM

## 2022-06-08 NOTE — TELEPHONE ENCOUNTER
Spoke with patient regarding stress echocardiogram order. Patient walks with a cane and has a lot of generalized muscle pain. Spoke with patient's PCP Dr. Del Cid and agreed patient should have a lexiscan. Order changed. Patient is in agreement with this and will have scheduling call him to get a date and time for test.

## 2022-06-14 ENCOUNTER — HOSPITAL ENCOUNTER (OUTPATIENT)
Dept: NUCLEAR MEDICINE | Facility: CLINIC | Age: 47
Setting detail: NUCLEAR MEDICINE
Discharge: HOME | End: 2022-06-14
Attending: STUDENT IN AN ORGANIZED HEALTH CARE EDUCATION/TRAINING PROGRAM
Payer: COMMERCIAL

## 2022-06-14 ENCOUNTER — HOSPITAL ENCOUNTER (OUTPATIENT)
Dept: CARDIOLOGY | Facility: CLINIC | Age: 47
Discharge: HOME | End: 2022-06-14
Attending: STUDENT IN AN ORGANIZED HEALTH CARE EDUCATION/TRAINING PROGRAM
Payer: COMMERCIAL

## 2022-06-14 DIAGNOSIS — R06.02 SOB (SHORTNESS OF BREATH): ICD-10-CM

## 2022-06-14 DIAGNOSIS — R07.9 CHEST PAIN, UNSPECIFIED TYPE: ICD-10-CM

## 2022-06-14 LAB
CV STRESS MAX HR HE: 108
RATE PRESSURE PRODUCT: NORMAL
STRESS ECHO BASELINE DIASTOLIC HE: 86
STRESS ECHO BASELINE HR: 76 BPM
STRESS ECHO BASELINE SYSTOLIC BP: 125
STRESS ECHO CALCULATED PERCENT HR: 63 %
STRESS ECHO LAST STRESS DIASTOLIC BP: 76
STRESS ECHO LAST STRESS SYSTOLIC BP: 111
STRESS ECHO POST EXERCISE DUR SEC: 10 SEC
STRESS ECHO TARGET HR: 172

## 2022-06-14 PROCEDURE — 343N000001 HC RX 343: Performed by: STUDENT IN AN ORGANIZED HEALTH CARE EDUCATION/TRAINING PROGRAM

## 2022-06-14 PROCEDURE — 78452 HT MUSCLE IMAGE SPECT MULT: CPT | Mod: 26 | Performed by: INTERNAL MEDICINE

## 2022-06-14 PROCEDURE — 93018 CV STRESS TEST I&R ONLY: CPT | Performed by: INTERNAL MEDICINE

## 2022-06-14 PROCEDURE — 93017 CV STRESS TEST TRACING ONLY: CPT

## 2022-06-14 PROCEDURE — 93016 CV STRESS TEST SUPVJ ONLY: CPT | Performed by: INTERNAL MEDICINE

## 2022-06-14 PROCEDURE — 78452 HT MUSCLE IMAGE SPECT MULT: CPT

## 2022-06-14 PROCEDURE — 93017 CV STRESS TEST TRACING ONLY: CPT | Performed by: INTERNAL MEDICINE

## 2022-06-14 PROCEDURE — A9502 TC99M TETROFOSMIN: HCPCS | Performed by: STUDENT IN AN ORGANIZED HEALTH CARE EDUCATION/TRAINING PROGRAM

## 2022-06-14 PROCEDURE — 250N000011 HC RX IP 250 OP 636: Performed by: STUDENT IN AN ORGANIZED HEALTH CARE EDUCATION/TRAINING PROGRAM

## 2022-06-14 RX ORDER — REGADENOSON 0.08 MG/ML
0.4 INJECTION, SOLUTION INTRAVENOUS ONCE
Status: COMPLETED | OUTPATIENT
Start: 2022-06-14 | End: 2022-06-14

## 2022-06-14 RX ADMIN — TETROFOSMIN 32.1 MCI.: 1.38 INJECTION, POWDER, LYOPHILIZED, FOR SOLUTION INTRAVENOUS at 15:10

## 2022-06-14 RX ADMIN — REGADENOSON 0.4 MG: 0.08 INJECTION, SOLUTION INTRAVENOUS at 14:08

## 2022-06-14 RX ADMIN — TETROFOSMIN 10.2 MCI.: 1.38 INJECTION, POWDER, LYOPHILIZED, FOR SOLUTION INTRAVENOUS at 12:40

## 2022-06-21 ENCOUNTER — TELEPHONE (OUTPATIENT)
Dept: FAMILY MEDICINE | Facility: CLINIC | Age: 47
End: 2022-06-21
Payer: COMMERCIAL

## 2022-06-21 ENCOUNTER — TELEPHONE (OUTPATIENT)
Dept: CARDIOLOGY | Facility: CLINIC | Age: 47
End: 2022-06-21

## 2022-06-21 NOTE — TELEPHONE ENCOUNTER
Reason for Call:  Other appointment    Detailed comments: Patient calling to see if he can get the results of his stress test. He doesn't want to wait until is 07/05 appointment. Please call patient.     Phone Number Patient can be reached at: Home number on file [phone] (home)    Best Time: any    Can we leave a detailed message on this number? YES    Call taken on 6/21/2022 at 9:56 AM by Nida Tee

## 2022-06-21 NOTE — TELEPHONE ENCOUNTER
Results have to come from the ordering provider which was Dr. Del Cid. He needs to get in touch his with office to get the stress test results. Dr. Mathew will then talk further detail of results with patient when he comes to see her on 7/5/22.

## 2022-06-21 NOTE — TELEPHONE ENCOUNTER
Patient calling stating that he had a Kassy Scan completed at 06/14/22. He was informed that someone would call him right away with the results, but no one did. The patient became tearful because he was unsure what type of activity he could do.     , would you please call this patient and discuss the Kassy scan results with him? He is very worried about his cardiac health.   PHONE: [phone].     Carlos Long, CHERYLN, RN, PHN  [facility] RN for [place] [facility] [employer] [place]  June 21, 2022     recovery pelvic rest for six weeks.  Activity as tolerated.  Regular diet.

## 2022-11-17 ENCOUNTER — TRANSCRIBE ORDERS (OUTPATIENT)
Dept: OTHER | Age: 47
End: 2022-11-17

## 2022-11-17 DIAGNOSIS — M54.41 CHRONIC MIDLINE LOW BACK PAIN WITH RIGHT-SIDED SCIATICA: ICD-10-CM

## 2022-11-17 DIAGNOSIS — G89.29 CHRONIC RIGHT SHOULDER PAIN: ICD-10-CM

## 2022-11-17 DIAGNOSIS — M25.511 CHRONIC RIGHT SHOULDER PAIN: ICD-10-CM

## 2022-11-17 DIAGNOSIS — G89.29 CHRONIC MIDLINE LOW BACK PAIN WITH RIGHT-SIDED SCIATICA: ICD-10-CM

## 2022-11-17 DIAGNOSIS — G89.29 NECK PAIN, CHRONIC: Primary | ICD-10-CM

## 2022-11-17 DIAGNOSIS — M54.50 CHRONIC MIDLINE LOW BACK PAIN WITHOUT SCIATICA: ICD-10-CM

## 2022-11-17 DIAGNOSIS — M54.2 NECK PAIN, CHRONIC: Primary | ICD-10-CM

## 2022-11-17 DIAGNOSIS — G89.29 CHRONIC MIDLINE LOW BACK PAIN WITHOUT SCIATICA: ICD-10-CM

## 2022-11-30 ENCOUNTER — HOSPITAL ENCOUNTER (OUTPATIENT)
Dept: PHYSICAL THERAPY | Facility: CLINIC | Age: 47
Setting detail: THERAPIES SERIES
Discharge: HOME | End: 2022-11-30
Attending: FAMILY MEDICINE
Payer: COMMERCIAL

## 2022-11-30 DIAGNOSIS — G89.29 CHRONIC MIDLINE LOW BACK PAIN WITHOUT SCIATICA: ICD-10-CM

## 2022-11-30 DIAGNOSIS — G89.29 CHRONIC RIGHT SHOULDER PAIN: ICD-10-CM

## 2022-11-30 DIAGNOSIS — M25.511 CHRONIC RIGHT SHOULDER PAIN: ICD-10-CM

## 2022-11-30 DIAGNOSIS — M54.41 CHRONIC MIDLINE LOW BACK PAIN WITH RIGHT-SIDED SCIATICA: ICD-10-CM

## 2022-11-30 DIAGNOSIS — G89.29 CHRONIC MIDLINE LOW BACK PAIN WITH RIGHT-SIDED SCIATICA: ICD-10-CM

## 2022-11-30 DIAGNOSIS — G89.29 NECK PAIN, CHRONIC: ICD-10-CM

## 2022-11-30 DIAGNOSIS — M54.50 CHRONIC MIDLINE LOW BACK PAIN WITHOUT SCIATICA: ICD-10-CM

## 2022-11-30 DIAGNOSIS — M54.2 NECK PAIN, CHRONIC: ICD-10-CM

## 2022-11-30 PROCEDURE — 97162 PT EVAL MOD COMPLEX 30 MIN: CPT | Mod: GP | Performed by: PHYSICAL THERAPIST

## 2022-11-30 PROCEDURE — 97110 THERAPEUTIC EXERCISES: CPT | Mod: GP | Performed by: PHYSICAL THERAPIST

## 2022-11-30 NOTE — PROGRESS NOTES
[facility]    OUTPATIENT PHYSICAL THERAPY ORTHOPEDIC EVALUATION  PLAN OF TREATMENT FOR OUTPATIENT REHABILITATION  (COMPLETE FOR INITIAL CLAIMS ONLY)  Patient's Last Name, First Name, M.I.  YOB: 1975  Nicolas Zimmerman    Provider s Name:  [facility]   Medical Record No.  [id number]   Start of Care Date:  11/30/22   Onset Date:  11/15/22   Type:     _X__PT   ___OT   ___SLP Medical Diagnosis:  Neck pain, chronic (M54.2, G89.29)  - Primary Chronic midline low back pain with right-sided sciatica (M54.41, G89.29) Chronic midline low back pain without sciatica (M54.50, G89.29) Chronic right shoulder pain (M25.511, G89.29)     PT Diagnosis:  LBP/neck pain/R shoulder pain   Visits from SOC:  1      _________________________________________________________________________________  Plan of Treatment/Functional Goals:  gait training, joint mobilization, manual therapy, neuromuscular re-education, ROM, strengthening, stretching, balance training     Electrical stimulation, Cryotherapy, TENS, Traction, Hot packs     Goals  Goal Identifier: Cervical ROM  Goal Description: Pt will demonstrate 65 deg bi of cervical rot in order to be able to safely turn head to drive car safely.  Target Date: 12/28/22    Goal Identifier: sleeping  Goal Description: Pt will wake up less than 2x/ per night due to neck pain in order to get a restful night s sleep.  Target Date: 12/28/22    Goal Identifier: Shoulder ROM  Goal Description: Pt will demontrate full shoulder ROM w/o pain to be able to complete all ADL's  Target Date: 01/25/23    Goal Identifier: lifting  Goal Description: Pt will be able to lift 30+ lbs w/o pain to be able to lift around home w/o pain  Target Date: 01/25/23    Goal Identifier: HEP  Goal Description: Pt will be compliant and competent in HEP to allow them to achieve maximal  strength and reduce pain  Target Date: 01/25/23               Therapy Frequency:  1 time/week  Predicted Duration of Therapy Intervention:  8 weeks    Nida Moran, PT                 I CERTIFY THE NEED FOR THESE SERVICES FURNISHED UNDER        THIS PLAN OF TREATMENT AND WHILE UNDER MY CARE .             Physician Signature               Date    X_____________________________________________________                         Certification Date From:  11/30/22   Certification Date To:  01/25/23    Referring Provider:  Ajith Mott MD    Initial Assessment        See Epic Evaluation Start of Care Date: 11/30/22 11/30/22 0900   General Information   Type of Visit Initial OP Ortho PT Evaluation   Start of Care Date 11/30/22   Referring Physician Ajith Mott MD   Patient/Family Goals Statement reduce pain   Orders Evaluate and Treat   Date of Order 11/15/22   Certification Required? Yes   Medical Diagnosis Neck pain, chronic (M54.2, G89.29)  - Primary Chronic midline low back pain with right-sided sciatica (M54.41, G89.29) Chronic midline low back pain without sciatica (M54.50, G89.29) Chronic right shoulder pain (M25.511, G89.29)   Surgical/Medical history reviewed Yes   Precautions/Limitations no known precautions/limitations   General Information Comments PMH: broken bones, depression, heart problems, OA, open heart surgery   Body Part(s)   Body Part(s) Cervical Spine;Shoulder;Lumbar Spine/SI   Presentation and Etiology   Pertinent history of current problem (include personal factors and/or comorbidities that impact the POC) Pt relates chronic neck/back pain and also R shoulder pain. Pt relates has family history of terrible backs. Pt has had MRI of cervical and lumbar spine which has shown degenerative changes. Pt relates concern over the MD not addressing shoulder and thoracic spine enough(ie may need imaging). Pt relates when over working (ie ADL s/taking care of parents/etc)  mid back will start to hurt and will give out like jenga tower (happens daily). Pt relates when overfatigued his body will shut down and collapse. He has fallen and been unable to move for 30 min. He has a hx of heart issues however that is being further evaluated at this time. Pt relates abnormal EKG but doing more testing. Collapse may have been due to panic attack. Pt relates he can feel when spine is going to collapse. At this time, pain moves around and his biggest concern is his shoulder and spine. Pt relates have neck pain with headaches, also has pinched nerve. Pain w radiate down lateral aspect or R arm to hand. Will get occasional numbness and tingling. Pt relates occasionally happens on L. Pt has hx of R shoulder dislocation but was years ago. Pt also has mid and LBP. Pt has pain radiating around rib cage and down lateral aspect of R leg. Pt unsure when he will return to MD. Pt has not had injections or met with ortho or neuro.   Impairments A. Pain;E. Decreased flexibility;F. Decreased strength and endurance;H. Impaired gait;K. Numbness;L. Tingling;M. Locking or catching;N. Headaches   Functional Limitations perform required work activities;perform activities of daily living;perform desired leisure / sports activities   Symptom Location neck, spine, R shoulder   How/Where did it occur From insidious onset   Onset date of current episode/exacerbation 11/15/22   Chronicity Chronic   Pain rating (0-10 point scale) Best (/10);Worst (/10)   Best (/10) 3   Worst (/10) 9   Pain quality A. Sharp;C. Aching;D. Burning;E. Shooting;F. Stabbing   Frequency of pain/symptoms B. Intermittent   Pain/symptoms exacerbated by C. Lifting;D. Carrying;G. Certain positions;I. Bending;K. Home tasks;L. Work tasks   Pain/symptoms eased by C. Rest   Progression of symptoms since onset: Worsened   Current Level of Function   Current Community Support Family/friend caregiver   Patient role/employment history E. Unemployed  (caregiver  for ill parents)   Living environment [facility]   Fall Risk Screen   Fall screen completed by PT   Have you fallen 2 or more times in the past year? Yes   Have you fallen and had an injury in the past year? No   Is patient a fall risk? No   Abuse Screen (yes response referral indicated)   Feels Unsafe at Home or Work/School no   Feels Threatened by Someone no   Does Anyone Try to Keep You From Having Contact with Others or Doing Things Outside Your Home? no   Physical Signs of Abuse Present no   System Outcome Measures   Outcome Measures Low Back Pain (see Oswestry and Annalisa)   Functional Scales   Functional Scales Other   Lumbar Spine/SI Objective Findings   Gait/Locomotion uses SEC -   Cervical Spine   Cervical Right Rotation ROM 43   Cervical Left Rotation ROM 51   Cervical Flexion ROM 45 w   Cervical Extension ROM 33 w pain refering to R shoulder   Shoulder Shrug (C2-C4) Strength 5/5   Shoulder Abd (C5) Strength 5/5   Shoulder Add (C7) Strength 5/5   Shoulder ER (C5, C6) Strength 5/5   Spurling Test R ext: min R uE symtpsom- negativep   Segmental Mobility-Cervical mod tightsn,   UE Neural Tension UE Neural Tension Tests   Posture rounded shoulders, fwd head posture, B scap winging   Shoulder Objective Findings   Side (if bilateral, select both right and left) Right   Shoulder ROM Comment full ROM w pain   Darden-Bg Test R: +   Sulcus Test R: -   Crossover Test R: -   Shoulder Special Tests Comments speeds: R: -   Right Shoulder Flexion AROM 180 w pain at 160   Right Shoulder Abduction AROM 180   Right Shoulder ER AROM C3   Right Shoulder IR AROM T10   Planned Therapy Interventions   Planned Therapy Interventions gait training;joint mobilization;manual therapy;neuromuscular re-education;ROM;strengthening;stretching;balance training   Planned Modality Interventions   Planned Modality Interventions Electrical stimulation;Cryotherapy;TENS;Traction;Hot packs   Clinical Impression   Criteria for Skilled  Therapeutic Interventions Met yes, treatment indicated   PT Diagnosis LBP/neck pain/R shoulder pain   Influenced by the following impairments weakness, pain   Functional limitations due to impairments carrying, lifting, bending, work tasks   Clinical Presentation Evolving/Changing   Clinical Presentation Rationale Pt is 47 yr old male who presents with degenerative changes in spine resulting in chronic pain in back and neck. Pt symptoms radiating to R UE. Pt has mild shoulder impingemtn on R hwowever has full ROM. Concerning factors include spine giving out/falls. Plan to continue weekly for up to 8 weeks. If no improvement plan to refer back to MD   Clinical Decision Making (Complexity) Moderate complexity   Therapy Frequency 1 time/week   Predicted Duration of Therapy Intervention (days/wks) 8 weeks   Risk & Benefits of therapy have been explained Yes   Patient, Family & other staff in agreement with plan of care Yes   Education Assessment   Preferred Learning Style Listening;Reading;Demonstration;Pictures/video   Barriers to Learning No barriers   ORTHO GOALS   PT Ortho Eval Goals 1;2;3;4;5;6;7;8   Ortho Goal 1   Goal Identifier Cervical ROM   Goal Description Pt will demonstrate 65 deg bi of cervical rot in order to be able to safely turn head to drive car safely.   Target Date 12/28/22   Ortho Goal 2   Goal Identifier sleeping   Goal Description Pt will wake up less than 2x/ per night due to neck pain in order to get a restful night s sleep.   Target Date 12/28/22   Ortho Goal 3   Goal Identifier Shoulder ROM   Goal Description Pt will demontrate full shoulder ROM w/o pain to be able to complete all ADL's   Target Date 01/25/23   Ortho Goal 4   Goal Identifier lifting   Goal Description Pt will be able to lift 30+ lbs w/o pain to be able to lift around home w/o pain   Target Date 01/25/23   Ortho Goal 5   Goal Identifier HEP   Goal Description Pt will be compliant and competent in HEP to allow them to achieve  maximal strength and reduce pain   Target Date 01/25/23   Total Evaluation Time   PT Eval, Moderate Complexity Minutes (71383) 40   Therapy Certification   Certification date from 11/30/22   Certification date to 01/25/23   Medical Diagnosis Neck pain, chronic (M54.2, G89.29)  - Primary Chronic midline low back pain with right-sided sciatica (M54.41, G89.29) Chronic midline low back pain without sciatica (M54.50, G89.29) Chronic right shoulder pain (M25.511, G89.29)

## 2023-10-25 NOTE — PROGRESS NOTES
Outpatient Physical Therapy Discharge Note     Patient: Nicolas Zimmerman  : 1975    Evaluation date:  22     Referring Provider: Nader Isidro Diagnosis: Neck/LBP    Client Self Report:   Current status is unknown since patient did not return for further PT visits.    Objective Measurements:  Current objective status is unknown since patient failed to complete treatment     Goals:  Goal Identifier Cervical ROM   Goal Description Pt will demonstrate 65 deg bi of cervical rot in order to be able to safely turn head to drive car safely.   Target Date 22   Date Met      Progress (detail required for progress note):       Goal Identifier sleeping   Goal Description Pt will wake up less than 2x/ per night due to neck pain in order to get a restful night s sleep.   Target Date 22   Date Met      Progress (detail required for progress note):       Goal Identifier Shoulder ROM   Goal Description Pt will demontrate full shoulder ROM w/o pain to be able to complete all ADL's   Target Date 23   Date Met      Progress (detail required for progress note):       Goal Identifier lifting   Goal Description Pt will be able to lift 30+ lbs w/o pain to be able to lift around home w/o pain   Target Date 23   Date Met      Progress (detail required for progress note):       Goal Identifier HEP   Goal Description Pt will be compliant and competent in HEP to allow them to achieve maximal strength and reduce pain   Target Date 23   Date Met      Progress (detail required for progress note):       Goal Identifier     Goal Description     Target Date     Date Met      Progress (detail required for progress note):       Goal Identifier     Goal Description     Target Date     Date Met      Progress (detail required for progress note):       Goal Identifier     Goal Description     Target Date     Date Met      Progress (detail required for progress note):         Progress towards Goals:    Progress this reporting period: Pt has failed to schedule f/u visits within 30 days from last visit thus is being d/c from therapy at this time. Objective measures are all taken from last visit. Current status is unknown at this time.    Plan:  Discharge from therapy.    Discharge:    Reason for Discharge: Patient has failed to schedule further appointments.    Equipment Issued: none    Discharge Plan:  Not completed since patient failed to schedule further appointments.    Nida Moran  [place] [address]  [email or web address]   [email or web address]   Office: [phone] Fax: [phone]

## (undated) RX ORDER — FENTANYL CITRATE 50 UG/ML
INJECTION, SOLUTION INTRAMUSCULAR; INTRAVENOUS
Status: DISPENSED
Start: 2020-11-25